# Patient Record
Sex: FEMALE | Race: WHITE | NOT HISPANIC OR LATINO | Employment: PART TIME | URBAN - METROPOLITAN AREA
[De-identification: names, ages, dates, MRNs, and addresses within clinical notes are randomized per-mention and may not be internally consistent; named-entity substitution may affect disease eponyms.]

---
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
## 2022-12-12 ENCOUNTER — OFFICE VISIT (OUTPATIENT)
Dept: INTERNAL MEDICINE | Facility: CLINIC | Age: 22
End: 2022-12-12

## 2022-12-12 VITALS
SYSTOLIC BLOOD PRESSURE: 150 MMHG | HEART RATE: 101 BPM | BODY MASS INDEX: 40.06 KG/M2 | HEIGHT: 70 IN | WEIGHT: 279.8 LBS | TEMPERATURE: 98.2 F | DIASTOLIC BLOOD PRESSURE: 96 MMHG | OXYGEN SATURATION: 98 % | RESPIRATION RATE: 18 BRPM

## 2022-12-12 DIAGNOSIS — H73.92 ABNORMAL TYMPANIC MEMBRANE OF LEFT EAR: ICD-10-CM

## 2022-12-12 DIAGNOSIS — Z00.00 ROUTINE HEALTH MAINTENANCE: ICD-10-CM

## 2022-12-12 DIAGNOSIS — I10 ESSENTIAL HYPERTENSION: Primary | ICD-10-CM

## 2022-12-12 DIAGNOSIS — Z83.49 FAMILY HISTORY OF HASHIMOTO THYROIDITIS: ICD-10-CM

## 2022-12-12 DIAGNOSIS — E66.01 MORBID (SEVERE) OBESITY DUE TO EXCESS CALORIES: ICD-10-CM

## 2022-12-12 DIAGNOSIS — R53.83 FATIGUE, UNSPECIFIED TYPE: ICD-10-CM

## 2022-12-12 PROCEDURE — 99204 OFFICE O/P NEW MOD 45 MIN: CPT | Performed by: NURSE PRACTITIONER

## 2022-12-12 RX ORDER — NIFEDIPINE 30 MG/1
30 TABLET, EXTENDED RELEASE ORAL DAILY
Qty: 30 TABLET | Refills: 1 | Status: SHIPPED | OUTPATIENT
Start: 2022-12-12 | End: 2023-02-07 | Stop reason: SDUPTHER

## 2022-12-12 NOTE — PATIENT INSTRUCTIONS
Gynecology:    [address] [facility] [address] [facility], [address]   [phone]    Mental Health and Counseling Services:    [facility] or [facility] Direct - [email or web address]    [facility]:  [phone]; [email or web address]    [facility]:  [phone]

## 2022-12-12 NOTE — PROGRESS NOTES
Subjective    Estela Santos is a 22 y.o. female presenting today for   Chief Complaint   Patient presents with   • Establish Care     Having blood pressure concerns       History of Present Illness     Estela Santos presents today as a new patient to me to establish care.   Prior PCP was no one for many years.  Patient Care Team:  Crystal Bobo APRN as PCP - General (Family Medicine)    Current/chronic health conditions include:    There is no problem list on file for this patient.      No outpatient medications have been marked as taking for the 12/12/22 encounter (Office Visit) with Crystal Bobo APRN.       Prior to 2020 she had been tx'ed for HTN w/ 10mg of Lisinopril. She had gone to a [facility] for a refill and was prescribed 60days. She ran out of this approx 2-3 weeks ago. She was dx'ed w/ HTN in 2016 as a young teenager. She had renal scans and holter and this was determined to be Essential HTN. She does not self-monitor. She affirms a cough when she takes Lisinopril.    No recent labs.    Regular periods. No current contraception. She is sexually active.    There is FH of hashimoto in her mother. Pt struggles w/ fatigue and inability to lose wgt.    She had a L OM in Nov and completed abx but is still unable to hear in this ear.      The following portions of the patient's history were reviewed and updated as appropriate: allergies, current medications, problem list, past medical history, past surgical history, family history, and social history.     Review of Systems   HENT: Negative for ear discharge and ear pain.    Respiratory: Positive for cough. Negative for shortness of breath.    Cardiovascular: Negative for chest pain and palpitations.   Neurological: Negative for dizziness and headache.       Objective    Vitals:    12/12/22 1307   BP: 150/96   BP Location: Right arm   Patient Position: Sitting   Cuff Size: Large Adult   Pulse: 101   Resp: 18   Temp: 98.2 °F (36.8 °C)   TempSrc:  "Infrared   SpO2: 98%   Weight: 127 kg (279 lb 12.8 oz)   Height: 177.8 cm (70\")     Body mass index is 40.15 kg/m².  Nursing notes and vitals reviewed.    Physical Exam  Constitutional:       General: She is not in acute distress.     Appearance: She is well-developed.   HENT:      Head: Normocephalic.      Right Ear: Hearing, tympanic membrane, ear canal and external ear normal.      Left Ear: External ear normal.      Ears:      Comments: The anatomy of the L TM is distorted. There maybe a perforation. The AC is moist appearing.     Nose: Nose normal.      Mouth/Throat:      Mouth: Mucous membranes are moist.      Pharynx: Oropharynx is clear. Uvula midline.   Neck:      Thyroid: No thyroid mass or thyromegaly.   Cardiovascular:      Rate and Rhythm: Regular rhythm.      Pulses: Normal pulses.      Heart sounds: S1 normal and S2 normal. No murmur heard.    No friction rub. No gallop.   Pulmonary:      Effort: Pulmonary effort is normal.      Breath sounds: Normal breath sounds. No wheezing, rhonchi or rales.   Musculoskeletal:      Cervical back: Neck supple.   Lymphadenopathy:      Cervical: No cervical adenopathy.   Neurological:      Mental Status: She is alert and oriented to person, place, and time.      Cranial Nerves: No cranial nerve deficit.      Sensory: No sensory deficit.   Psychiatric:         Attention and Perception: She is attentive.         Speech: Speech normal.         Behavior: Behavior normal.         No results found for this or any previous visit (from the past 672 hour(s)).      Assessment and Plan    Diagnoses and all orders for this visit:    1. Essential hypertension (Primary)  -     NIFEdipine XL (Procardia XL) 30 MG 24 hr tablet; Take 1 tablet by mouth Daily.  Dispense: 30 tablet; Refill: 1  -     CBC (No Diff)  -     Comprehensive Metabolic Panel  -     Lipid Panel With / Chol / HDL Ratio  -     TSH  -     T4, Free    2. Family history of Hashimoto thyroiditis    3. Fatigue, " unspecified type  -     CBC (No Diff)  -     Comprehensive Metabolic Panel  -     Hemoglobin A1c  -     TSH  -     T4, Free  -     Thyroid Antibodies  -     Vitamin B12  -     Vitamin D,25-Hydroxy    4. Morbid (severe) obesity due to excess calories (HCC)  -     Hemoglobin A1c  -     Lipid Panel With / Chol / HDL Ratio  -     TSH  -     T4, Free  -     Thyroid Antibodies    5. Abnormal tympanic membrane of left ear  -     Ambulatory Referral to ENT (Otolaryngology)    6. Routine health maintenance  -     Hepatitis C Antibody              Medications, including side effects, were discussed with the patient. Patient verbalized understanding.  The plan of care was discussed. All questions were answered. Patient verbalized understanding.        Return for asap, Annual physical, fasting labs one week prior to.

## 2022-12-30 LAB
25(OH)D3+25(OH)D2 SERPL-MCNC: 12.9 NG/ML (ref 30–100)
ALBUMIN SERPL-MCNC: 4.3 G/DL (ref 3.5–5.2)
ALBUMIN/GLOB SERPL: 1.5 G/DL
ALP SERPL-CCNC: 83 U/L (ref 39–117)
ALT SERPL-CCNC: 33 U/L (ref 1–33)
AST SERPL-CCNC: 24 U/L (ref 1–32)
BILIRUB SERPL-MCNC: 0.3 MG/DL (ref 0–1.2)
BUN SERPL-MCNC: 7 MG/DL (ref 6–20)
BUN/CREAT SERPL: 11.3 (ref 7–25)
CALCIUM SERPL-MCNC: 9.1 MG/DL (ref 8.6–10.5)
CHLORIDE SERPL-SCNC: 106 MMOL/L (ref 98–107)
CHOLEST SERPL-MCNC: 145 MG/DL (ref 0–200)
CHOLEST/HDLC SERPL: 5 {RATIO}
CO2 SERPL-SCNC: 24.2 MMOL/L (ref 22–29)
CREAT SERPL-MCNC: 0.62 MG/DL (ref 0.57–1)
EGFRCR SERPLBLD CKD-EPI 2021: 130.9 ML/MIN/1.73
ERYTHROCYTE [DISTWIDTH] IN BLOOD BY AUTOMATED COUNT: 14.9 % (ref 12.3–15.4)
GLOBULIN SER CALC-MCNC: 2.9 GM/DL
GLUCOSE SERPL-MCNC: 100 MG/DL (ref 65–99)
HBA1C MFR BLD: 5.4 % (ref 4.8–5.6)
HCT VFR BLD AUTO: 41.7 % (ref 34–46.6)
HCV AB S/CO SERPL IA: 0.2 S/CO RATIO (ref 0–0.9)
HDLC SERPL-MCNC: 29 MG/DL (ref 40–60)
HGB BLD-MCNC: 13.3 G/DL (ref 12–15.9)
LDLC SERPL CALC-MCNC: 95 MG/DL (ref 0–100)
MCH RBC QN AUTO: 25.6 PG (ref 26.6–33)
MCHC RBC AUTO-ENTMCNC: 31.9 G/DL (ref 31.5–35.7)
MCV RBC AUTO: 80.2 FL (ref 79–97)
PLATELET # BLD AUTO: 347 10*3/MM3 (ref 140–450)
POTASSIUM SERPL-SCNC: 4.1 MMOL/L (ref 3.5–5.2)
PROT SERPL-MCNC: 7.2 G/DL (ref 6–8.5)
RBC # BLD AUTO: 5.2 10*6/MM3 (ref 3.77–5.28)
SODIUM SERPL-SCNC: 138 MMOL/L (ref 136–145)
T4 FREE SERPL-MCNC: 1.1 NG/DL (ref 0.93–1.7)
THYROGLOB AB SERPL-ACNC: <1 IU/ML (ref 0–0.9)
THYROPEROXIDASE AB SERPL-ACNC: 9 IU/ML (ref 0–34)
TRIGL SERPL-MCNC: 115 MG/DL (ref 0–150)
TSH SERPL DL<=0.005 MIU/L-ACNC: 2.94 UIU/ML (ref 0.27–4.2)
VIT B12 SERPL-MCNC: 272 PG/ML (ref 211–946)
VLDLC SERPL CALC-MCNC: 21 MG/DL (ref 5–40)
WBC # BLD AUTO: 8.4 10*3/MM3 (ref 3.4–10.8)

## 2023-01-05 ENCOUNTER — OFFICE VISIT (OUTPATIENT)
Dept: INTERNAL MEDICINE | Facility: CLINIC | Age: 23
End: 2023-01-05
Payer: COMMERCIAL

## 2023-01-05 VITALS
BODY MASS INDEX: 40 KG/M2 | HEART RATE: 107 BPM | WEIGHT: 279.4 LBS | HEIGHT: 70 IN | TEMPERATURE: 98.2 F | DIASTOLIC BLOOD PRESSURE: 80 MMHG | SYSTOLIC BLOOD PRESSURE: 132 MMHG | OXYGEN SATURATION: 97 %

## 2023-01-05 DIAGNOSIS — E78.6 LOW HDL (UNDER 40): ICD-10-CM

## 2023-01-05 DIAGNOSIS — E55.9 VITAMIN D DEFICIENCY: ICD-10-CM

## 2023-01-05 DIAGNOSIS — I10 ESSENTIAL HYPERTENSION: ICD-10-CM

## 2023-01-05 DIAGNOSIS — Z00.00 ENCOUNTER FOR WELLNESS EXAMINATION IN ADULT: Primary | ICD-10-CM

## 2023-01-05 PROCEDURE — 99395 PREV VISIT EST AGE 18-39: CPT | Performed by: NURSE PRACTITIONER

## 2023-01-05 NOTE — PATIENT INSTRUCTIONS
Take 5000 IU of vitamin D everyday.    Gynecology:    [address], [address]   [phone]

## 2023-01-05 NOTE — PROGRESS NOTES
SUZI Palmer is a 22 y.o. female presenting for Annual Exam    Her current/chronic health conditions include:  There is no problem list on file for this patient.      Outpatient Medications Marked as Taking for the 1/5/23 encounter (Office Visit) with Crystal Bobo APRN   Medication Sig Dispense Refill   • NIFEdipine XL (Procardia XL) 30 MG 24 hr tablet Take 1 tablet by mouth Daily. 30 tablet 1         Screenings:  PAP: She has been sexually active for approx 3 yrs. No prior PAP.  Mammogram: no FH of breast cancer  Dexa: n/a  Colon Cancer:no 1st degree FH of CRC  Tob use: n/a          The following portions of the patient's history were reviewed and updated as appropriate: allergies, current medications, problem list, past medical history, past family history, and past social history.    Review of Systems   Constitutional: Negative.    HENT: Negative.    Eyes: Negative.    Respiratory: Negative.    Cardiovascular: Negative.    Gastrointestinal: Negative.    Endocrine: Negative.    Genitourinary: Negative.    Musculoskeletal: Negative.    Skin: Negative.    Allergic/Immunologic: Negative.    Neurological: Negative.    Hematological: Negative.    Psychiatric/Behavioral: Negative.        OBJECTIVE    Vitals:    01/05/23 1448   BP: 132/80   BP Location: Right arm   Pulse: 107   Temp: 98.2 °F (36.8 °C)   SpO2: 97%   Weight: 127 kg (279 lb 6.4 oz)   Height: 177.8 cm (70\")       BP Readings from Last 3 Encounters:   01/05/23 132/80   12/12/22 150/96        Wt Readings from Last 3 Encounters:   01/05/23 127 kg (279 lb 6.4 oz)   12/12/22 127 kg (279 lb 12.8 oz)        Body mass index is 40.09 kg/m².  Nursing notes and vital signs reviewed.      Physical Exam  Constitutional:       General: She is not in acute distress.     Appearance: Normal appearance. She is well-developed.   HENT:      Head: Normocephalic.      Right Ear: Hearing, tympanic membrane, ear canal and external ear normal.      Left Ear:  Hearing, tympanic membrane, ear canal and external ear normal.      Nose: Nose normal. No mucosal edema or rhinorrhea.      Mouth/Throat:      Mouth: Mucous membranes are moist.      Pharynx: Oropharynx is clear. Uvula midline.   Eyes:      General: Lids are normal.      Extraocular Movements: Extraocular movements intact.      Conjunctiva/sclera: Conjunctivae normal.      Pupils: Pupils are equal, round, and reactive to light.   Neck:      Thyroid: No thyroid mass or thyromegaly.   Cardiovascular:      Rate and Rhythm: Regular rhythm.      Pulses: Normal pulses.      Heart sounds: S1 normal and S2 normal. No murmur heard.    No friction rub. No gallop.   Pulmonary:      Effort: Pulmonary effort is normal.      Breath sounds: Normal breath sounds. No wheezing, rhonchi or rales.   Abdominal:      General: Bowel sounds are normal.      Palpations: Abdomen is soft.      Tenderness: There is no abdominal tenderness. There is no guarding.      Hernia: No hernia is present.   Musculoskeletal:         General: No deformity. Normal range of motion.      Cervical back: Normal range of motion and neck supple.   Lymphadenopathy:      Cervical: No cervical adenopathy.   Skin:     General: Skin is warm and dry.      Findings: No lesion or rash.   Neurological:      General: No focal deficit present.      Mental Status: She is alert and oriented to person, place, and time.      Cranial Nerves: No cranial nerve deficit.      Sensory: No sensory deficit.      Motor: Motor function is intact.      Coordination: Coordination is intact.      Gait: Gait normal.      Deep Tendon Reflexes: Reflexes are normal and symmetric.   Psychiatric:         Attention and Perception: She is attentive.         Mood and Affect: Mood and affect normal.         Speech: Speech normal.         Behavior: Behavior normal.         Thought Content: Thought content normal.         Recent Results (from the past 672 hour(s))   CBC (No Diff)    Collection Time:  12/29/22  8:47 AM    Specimen: Blood   Result Value Ref Range    WBC 8.40 3.40 - 10.80 10*3/mm3    RBC 5.20 3.77 - 5.28 10*6/mm3    Hemoglobin 13.3 12.0 - 15.9 g/dL    Hematocrit 41.7 34.0 - 46.6 %    MCV 80.2 79.0 - 97.0 fL    MCH 25.6 (L) 26.6 - 33.0 pg    MCHC 31.9 31.5 - 35.7 g/dL    RDW 14.9 12.3 - 15.4 %    Platelets 347 140 - 450 10*3/mm3   Comprehensive Metabolic Panel    Collection Time: 12/29/22  8:47 AM    Specimen: Blood   Result Value Ref Range    Glucose 100 (H) 65 - 99 mg/dL    BUN 7 6 - 20 mg/dL    Creatinine 0.62 0.57 - 1.00 mg/dL    EGFR Result 130.9 >60.0 mL/min/1.73    BUN/Creatinine Ratio 11.3 7.0 - 25.0    Sodium 138 136 - 145 mmol/L    Potassium 4.1 3.5 - 5.2 mmol/L    Chloride 106 98 - 107 mmol/L    Total CO2 24.2 22.0 - 29.0 mmol/L    Calcium 9.1 8.6 - 10.5 mg/dL    Total Protein 7.2 6.0 - 8.5 g/dL    Albumin 4.3 3.5 - 5.2 g/dL    Globulin 2.9 gm/dL    A/G Ratio 1.5 g/dL    Total Bilirubin 0.3 0.0 - 1.2 mg/dL    Alkaline Phosphatase 83 39 - 117 U/L    AST (SGOT) 24 1 - 32 U/L    ALT (SGPT) 33 1 - 33 U/L   Hemoglobin A1c    Collection Time: 12/29/22  8:47 AM    Specimen: Blood   Result Value Ref Range    Hemoglobin A1C 5.40 4.80 - 5.60 %   Hepatitis C Antibody    Collection Time: 12/29/22  8:47 AM    Specimen: Blood   Result Value Ref Range    Hep C Virus Ab 0.2 0.0 - 0.9 s/co ratio   Lipid Panel With / Chol / HDL Ratio    Collection Time: 12/29/22  8:47 AM    Specimen: Blood   Result Value Ref Range    Total Cholesterol 145 0 - 200 mg/dL    Triglycerides 115 0 - 150 mg/dL    HDL Cholesterol 29 (L) 40 - 60 mg/dL    VLDL Cholesterol Ray 21 5 - 40 mg/dL    LDL Chol Calc (NIH) 95 0 - 100 mg/dL    Chol/HDL Ratio 5.00    TSH    Collection Time: 12/29/22  8:47 AM    Specimen: Blood   Result Value Ref Range    TSH 2.940 0.270 - 4.200 uIU/mL   T4, Free    Collection Time: 12/29/22  8:47 AM    Specimen: Blood   Result Value Ref Range    Free T4 1.10 0.93 - 1.70 ng/dL   Thyroid Antibodies    Collection  Time: 12/29/22  8:47 AM    Specimen: Blood   Result Value Ref Range    Thyroid Peroxidase Antibody 9 0 - 34 IU/mL    Thyroglobulin Ab <1.0 0.0 - 0.9 IU/mL   Vitamin B12    Collection Time: 12/29/22  8:47 AM    Specimen: Blood   Result Value Ref Range    Vitamin B-12 272 211 - 946 pg/mL   Vitamin D,25-Hydroxy    Collection Time: 12/29/22  8:47 AM    Specimen: Blood   Result Value Ref Range    25 Hydroxy, Vitamin D 12.9 (L) 30.0 - 100.0 ng/ml         ASSESSMENT AND PLAN    Diagnoses and all orders for this visit:    1. Encounter for wellness examination in adult (Primary)    2. Essential hypertension    3. Low HDL (under 40)    4. Vitamin D deficiency        Preventative counseling completed including relevant screenings, appropriate vaccinations, healthy nutrition, and appropriate physical activity. Age-appropriate Screening & Interventions recommended by USPSTF were reviewed with the patient, and Health Maintenance was updated in Epic.  Class 3 Severe Obesity (BMI >=40). Obesity-related health conditions include the following: hypertension. Obesity is newly identified. BMI is is above average; BMI management plan is completed. We discussed low calorie, low carb based diet program, portion control, increasing exercise and an yudi-based approach such as Artabase Pal or Lose It.      Cont Procardia.  Start a QD vit D supp of 5000IU.      Medications, including side effects, were discussed with the patient. Patient verbalized understanding.  The plan of care was discussed. All questions were answered. Patient verbalized understanding.        Return in about 3 months (around 4/5/2023)., or sooner if needed.

## 2023-02-07 DIAGNOSIS — I10 ESSENTIAL HYPERTENSION: ICD-10-CM

## 2023-02-08 RX ORDER — NIFEDIPINE 30 MG/1
30 TABLET, EXTENDED RELEASE ORAL DAILY
Qty: 30 TABLET | Refills: 1 | Status: SHIPPED | OUTPATIENT
Start: 2023-02-08

## 2023-04-10 ENCOUNTER — OFFICE VISIT (OUTPATIENT)
Dept: INTERNAL MEDICINE | Facility: CLINIC | Age: 23
End: 2023-04-10
Payer: COMMERCIAL

## 2023-04-10 VITALS
WEIGHT: 269.8 LBS | TEMPERATURE: 98.6 F | DIASTOLIC BLOOD PRESSURE: 88 MMHG | RESPIRATION RATE: 18 BRPM | OXYGEN SATURATION: 98 % | SYSTOLIC BLOOD PRESSURE: 138 MMHG | BODY MASS INDEX: 38.63 KG/M2 | HEART RATE: 110 BPM | HEIGHT: 70 IN

## 2023-04-10 DIAGNOSIS — I10 ESSENTIAL HYPERTENSION: ICD-10-CM

## 2023-04-10 DIAGNOSIS — I10 ESSENTIAL HYPERTENSION: Primary | ICD-10-CM

## 2023-04-10 PROCEDURE — 99213 OFFICE O/P EST LOW 20 MIN: CPT | Performed by: NURSE PRACTITIONER

## 2023-04-10 RX ORDER — NIFEDIPINE 30 MG/1
TABLET, EXTENDED RELEASE ORAL
Qty: 90 TABLET | Refills: 1 | Status: SHIPPED | OUTPATIENT
Start: 2023-04-10

## 2023-04-10 RX ORDER — ADHESIVE BANDAGE 3/4"
1 BANDAGE TOPICAL DAILY
Qty: 1 EACH | Refills: 0 | Status: SHIPPED | OUTPATIENT
Start: 2023-04-10

## 2023-04-10 NOTE — PROGRESS NOTES
"Subjective   Estela Santos is a 22 y.o. female presenting today for follow up of   Chief Complaint   Patient presents with   • Hypertension     3 month follow up       History of Present Illness     Outpatient Medications Marked as Taking for the 4/10/23 encounter (Office Visit) with Crystal Bobo APRN   Medication Sig Dispense Refill   • NIFEdipine XL (Procardia XL) 30 MG 24 hr tablet Take 1 tablet by mouth Daily. 30 tablet 1       Presents for 3mo f/u r/t HTN. Very stressed today.      The following portions of the patient's history were reviewed and updated as appropriate: allergies, current medications, past family history, past medical history, past social history, past surgical history and problem list.    Review of Systems   Respiratory: Negative for shortness of breath.    Cardiovascular: Negative for chest pain and palpitations.   Neurological: Positive for headache (mild). Negative for dizziness.   Psychiatric/Behavioral: The patient is nervous/anxious.        Objective   Vitals:    04/10/23 1540 04/10/23 1555   BP: (!) 150/102 138/88   BP Location: Left arm    Patient Position: Sitting    Cuff Size: Large Adult    Pulse: (!) 152 110   Resp: 18    Temp: 98.6 °F (37 °C)    TempSrc: Oral    SpO2: 98%    Weight: 122 kg (269 lb 12.8 oz)    Height: 177.8 cm (70\")        BP Readings from Last 3 Encounters:   04/10/23 138/88   01/05/23 132/80   12/12/22 150/96        Wt Readings from Last 3 Encounters:   04/10/23 122 kg (269 lb 12.8 oz)   01/05/23 127 kg (279 lb 6.4 oz)   12/12/22 127 kg (279 lb 12.8 oz)        Body mass index is 38.71 kg/m².  Nursing notes and vitals reviewed.    Physical Exam  Constitutional:       General: She is not in acute distress.     Appearance: She is well-developed.   Cardiovascular:      Rate and Rhythm: Regular rhythm.      Heart sounds: S1 normal and S2 normal.   Pulmonary:      Effort: Pulmonary effort is normal.      Breath sounds: Normal breath sounds.   Neurological: "      Mental Status: She is alert and oriented to person, place, and time.   Psychiatric:         Attention and Perception: She is attentive.         Behavior: Behavior normal.         Thought Content: Thought content normal.         No results found for this or any previous visit (from the past 672 hour(s)).      Assessment & Plan   Diagnoses and all orders for this visit:    1. Essential hypertension (Primary)  -     Blood Pressure Monitoring (Blood Pressure Cuff) misc; 1 each Daily.  Dispense: 1 each; Refill: 0  - Marginal control  - Check BP readings at home and send in [employer] message each month  - No med change              Medications, including side effects, were discussed with the patient. Patient verbalized understanding.  The plan of care was discussed. All questions were answered. Patient verbalized understanding.      Return in about 3 months (around 7/10/2023).

## 2023-09-29 ENCOUNTER — OFFICE VISIT (OUTPATIENT)
Dept: INTERNAL MEDICINE | Facility: CLINIC | Age: 23
End: 2023-09-29
Payer: COMMERCIAL

## 2023-09-29 VITALS
WEIGHT: 263 LBS | HEIGHT: 70 IN | TEMPERATURE: 99.3 F | OXYGEN SATURATION: 97 % | DIASTOLIC BLOOD PRESSURE: 68 MMHG | HEART RATE: 92 BPM | SYSTOLIC BLOOD PRESSURE: 150 MMHG | BODY MASS INDEX: 37.65 KG/M2

## 2023-09-29 DIAGNOSIS — I10 ESSENTIAL HYPERTENSION: Primary | ICD-10-CM

## 2023-09-29 DIAGNOSIS — F41.1 GAD (GENERALIZED ANXIETY DISORDER): ICD-10-CM

## 2023-09-29 DIAGNOSIS — F33.1 MODERATE EPISODE OF RECURRENT MAJOR DEPRESSIVE DISORDER: ICD-10-CM

## 2023-09-29 PROCEDURE — 99214 OFFICE O/P EST MOD 30 MIN: CPT | Performed by: NURSE PRACTITIONER

## 2023-09-29 RX ORDER — SERTRALINE HYDROCHLORIDE 25 MG/1
25 TABLET, FILM COATED ORAL DAILY
Qty: 30 TABLET | Refills: 1 | Status: SHIPPED | OUTPATIENT
Start: 2023-09-29

## 2023-09-29 NOTE — PROGRESS NOTES
"Subjective   Estela Santos is a 23 y.o. female presenting today for follow up of   Chief Complaint   Patient presents with    Hypertension     F/u       History of Present Illness     Outpatient Medications Marked as Taking for the 9/29/23 encounter (Office Visit) with Crystal Bobo APRN   Medication Sig Dispense Refill    NIFEdipine XL (PROCARDIA XL) 60 MG 24 hr tablet Take 1 tablet by mouth Daily. 90 tablet 0       Presents for f/u 2/2 HTN.  She has been trying exercise over the past week. Walking 2.5miles.  Trying to eat at a calorie deficit. Averaging around 2000cal. Cutting back on sodium.  SMBP = 132-140/80-90    Reports h/o AMELIA and MDD.  Dx at 15.  Inpt care at dx d/t SI. No current SI/HI.  Has been tx'ed w/ Prozac and Effexor in the past. No meds since around onset of COVID. Did not like how she felt w/ Effexor. Prozac made her feel numb.  AMELIA worse since break up last mo.  - motivation  Shunned by most of family when she left their melissa, [employer].      The following portions of the patient's history were reviewed and updated as appropriate: allergies, current medications, past family history, past medical history, past social history, past surgical history and problem list.    Review of Systems   Eyes: Negative.    Respiratory: Negative.     Cardiovascular: Negative.    Neurological: Negative.      Objective   Vitals:    09/29/23 1527 09/29/23 1545   BP: 164/96 150/68   BP Location: Left arm    Patient Position: Sitting    Cuff Size: Adult    Pulse: 92    Temp: 99.3 °F (37.4 °C)    TempSrc: Infrared    SpO2: 97%    Weight: 119 kg (263 lb)    Height: 177.8 cm (70\")        BP Readings from Last 3 Encounters:   09/29/23 150/68   07/10/23 160/80   04/10/23 138/88        Wt Readings from Last 3 Encounters:   09/29/23 119 kg (263 lb)   07/10/23 124 kg (272 lb 9.6 oz)   04/10/23 122 kg (269 lb 12.8 oz)        Body mass index is 37.74 kg/m².  Nursing notes and vitals reviewed.    Physical " Exam  Constitutional:       General: She is not in acute distress.     Appearance: She is well-developed.   Cardiovascular:      Rate and Rhythm: Regular rhythm.      Heart sounds: S1 normal and S2 normal.   Pulmonary:      Effort: Pulmonary effort is normal.      Breath sounds: Normal breath sounds.   Neurological:      Mental Status: She is alert and oriented to person, place, and time.   Psychiatric:         Attention and Perception: She is attentive.         Behavior: Behavior normal.         Thought Content: Thought content normal.       No results found for this or any previous visit (from the past 672 hour(s)).      Assessment & Plan   Diagnoses and all orders for this visit:    1. Essential hypertension (Primary)    2. Moderate episode of recurrent major depressive disorder  -     sertraline (Zoloft) 25 MG tablet; Take 1 tablet by mouth Daily.  Dispense: 30 tablet; Refill: 1    3. AMELIA (generalized anxiety disorder)  -     sertraline (Zoloft) 25 MG tablet; Take 1 tablet by mouth Daily.  Dispense: 30 tablet; Refill: 1      1.BP elevated in office today. Home readings better but still above goal. Would like to see how anxiety management improves BP.    2, 3. Start Zoloft.        Medications, including side effects, were discussed with the patient. Patient verbalized understanding.  The plan of care was discussed. All questions were answered. Patient verbalized understanding.      Return in about 6 weeks (around 11/10/2023).

## 2023-10-14 DIAGNOSIS — I10 ESSENTIAL HYPERTENSION: ICD-10-CM

## 2023-10-16 RX ORDER — NIFEDIPINE 60 MG/1
60 TABLET, EXTENDED RELEASE ORAL DAILY
Qty: 90 TABLET | Refills: 0 | Status: SHIPPED | OUTPATIENT
Start: 2023-10-16

## 2023-11-08 DIAGNOSIS — F41.1 GAD (GENERALIZED ANXIETY DISORDER): ICD-10-CM

## 2023-11-08 DIAGNOSIS — F33.1 MODERATE EPISODE OF RECURRENT MAJOR DEPRESSIVE DISORDER: ICD-10-CM

## 2023-11-08 RX ORDER — SERTRALINE HYDROCHLORIDE 25 MG/1
25 TABLET, FILM COATED ORAL DAILY
Qty: 30 TABLET | Refills: 1 | Status: SHIPPED | OUTPATIENT
Start: 2023-11-08

## 2023-11-16 ENCOUNTER — OFFICE VISIT (OUTPATIENT)
Dept: INTERNAL MEDICINE | Facility: CLINIC | Age: 23
End: 2023-11-16
Payer: COMMERCIAL

## 2023-11-16 VITALS
WEIGHT: 255.4 LBS | HEIGHT: 70 IN | OXYGEN SATURATION: 97 % | DIASTOLIC BLOOD PRESSURE: 82 MMHG | BODY MASS INDEX: 36.56 KG/M2 | HEART RATE: 116 BPM | SYSTOLIC BLOOD PRESSURE: 130 MMHG | TEMPERATURE: 98.2 F

## 2023-11-16 DIAGNOSIS — I10 ESSENTIAL HYPERTENSION: ICD-10-CM

## 2023-11-16 DIAGNOSIS — Z13.1 ENCOUNTER FOR SCREENING FOR DIABETES MELLITUS: ICD-10-CM

## 2023-11-16 DIAGNOSIS — E66.01 CLASS 2 SEVERE OBESITY WITH SERIOUS COMORBIDITY AND BODY MASS INDEX (BMI) OF 36.0 TO 36.9 IN ADULT, UNSPECIFIED OBESITY TYPE: ICD-10-CM

## 2023-11-16 DIAGNOSIS — F41.1 GAD (GENERALIZED ANXIETY DISORDER): ICD-10-CM

## 2023-11-16 DIAGNOSIS — Z23 NEED FOR INFLUENZA VACCINATION: ICD-10-CM

## 2023-11-16 DIAGNOSIS — F33.1 MODERATE EPISODE OF RECURRENT MAJOR DEPRESSIVE DISORDER: Primary | ICD-10-CM

## 2023-11-16 NOTE — PROGRESS NOTES
"Lisa Santos is a 23 y.o. female presenting today for follow up of   Chief Complaint   Patient presents with    Hypertension         Outpatient Medications Marked as Taking for the 11/16/23 encounter (Office Visit) with Crystal Bobo APRN   Medication Sig Dispense Refill    NIFEdipine XL (PROCARDIA XL) 60 MG 24 hr tablet Take 1 tablet by mouth Daily. 90 tablet 0    sertraline (Zoloft) 50 MG tablet Take 1 tablet by mouth Daily. 90 tablet 1    [DISCONTINUED] sertraline (Zoloft) 25 MG tablet Take 1 tablet by mouth Daily. 30 tablet 1       Presents for 6wk f/u 2/2 AMELIA and MDD following start of Zoloft.  She would like to be referred to a psychologist. Her mother has a dx of Bipolar 2. Pt denies episodes of rosanne.  No negative SE from Zoloft. She feels benefits ore as they r/t anxiety, less as r/t depression. Feeling numb.    Also presents for f/u 2/2 HTN. SMBP 130-135/80s.      The following portions of the patient's history were reviewed and updated as appropriate: allergies, current medications, past family history, past medical history, past social history, past surgical history and problem list.    Review of Systems   Respiratory:  Negative for shortness of breath.    Cardiovascular:  Negative for chest pain.   Neurological:  Negative for dizziness and headache.   Psychiatric/Behavioral:  Negative for self-injury and suicidal ideas.        Objective   Vitals:    11/16/23 1442 11/16/23 1515   BP: 144/82 130/82   BP Location: Left arm    Patient Position: Sitting    Cuff Size: Large Adult    Pulse: 116    Temp: 98.2 °F (36.8 °C)    TempSrc: Infrared    SpO2: 97%    Weight: 116 kg (255 lb 6.4 oz)    Height: 177.8 cm (70\")        BP Readings from Last 3 Encounters:   11/16/23 130/82   09/29/23 150/68   07/10/23 160/80        Wt Readings from Last 3 Encounters:   11/16/23 116 kg (255 lb 6.4 oz)   09/29/23 119 kg (263 lb)   07/10/23 124 kg (272 lb 9.6 oz)        Body mass index is 36.65 " kg/m².  Nursing notes and vitals reviewed.    Physical Exam  Constitutional:       General: She is not in acute distress.     Appearance: She is well-developed.   Cardiovascular:      Rate and Rhythm: Regular rhythm.      Heart sounds: S1 normal and S2 normal.   Pulmonary:      Effort: Pulmonary effort is normal.      Breath sounds: Normal breath sounds.   Neurological:      Mental Status: She is alert and oriented to person, place, and time.   Psychiatric:         Attention and Perception: She is attentive.         Behavior: Behavior normal.         Thought Content: Thought content normal.         No results found for this or any previous visit (from the past 672 hour(s)).      Assessment & Plan   Diagnoses and all orders for this visit:    1. Moderate episode of recurrent major depressive disorder (Primary)  -     sertraline (Zoloft) 50 MG tablet; Take 1 tablet by mouth Daily.  Dispense: 90 tablet; Refill: 1  -     Ambulatory Referral to Psychology    2. AMELIA (generalized anxiety disorder)  -     sertraline (Zoloft) 50 MG tablet; Take 1 tablet by mouth Daily.  Dispense: 90 tablet; Refill: 1  -     Ambulatory Referral to Psychology    3. Essential hypertension    4. Need for influenza vaccination  -     Fluzone (or Fluarix & Flulaval for VFC) >6mos        #1, 2. Increase sertraline to 50mg.    3. Much better control. Cont current medication regimen.      Medications, including side effects, were discussed with the patient. Patient verbalized understanding.  The plan of care was discussed. All questions were answered. Patient verbalized understanding.      Return in about 8 weeks (around 1/11/2024) for Annual physical, fasting labs one week prior to.

## 2023-12-26 DIAGNOSIS — Z13.1 ENCOUNTER FOR SCREENING FOR DIABETES MELLITUS: ICD-10-CM

## 2023-12-26 DIAGNOSIS — E66.01 CLASS 2 SEVERE OBESITY WITH SERIOUS COMORBIDITY AND BODY MASS INDEX (BMI) OF 36.0 TO 36.9 IN ADULT, UNSPECIFIED OBESITY TYPE: ICD-10-CM

## 2023-12-26 DIAGNOSIS — I10 ESSENTIAL HYPERTENSION: ICD-10-CM

## 2024-01-06 DIAGNOSIS — I10 ESSENTIAL HYPERTENSION: ICD-10-CM

## 2024-01-08 RX ORDER — NIFEDIPINE 60 MG/1
60 TABLET, EXTENDED RELEASE ORAL DAILY
Qty: 90 TABLET | Refills: 0 | Status: SHIPPED | OUTPATIENT
Start: 2024-01-08

## 2024-01-08 NOTE — TELEPHONE ENCOUNTER
Normal serum potassium in past 12 months    GFR> 30 ml/min in past 12 months       Rx Refill Note  Requested Prescriptions     Pending Prescriptions Disp Refills    NIFEdipine XL (PROCARDIA XL) 60 MG 24 hr tablet 90 tablet 0     Sig: Take 1 tablet by mouth Daily.      Last office visit with prescribing clinician: 11/16/2023   Last telemedicine visit with prescribing clinician: Visit date not found   Next office visit with prescribing clinician: Visit date not found                         Would you like a call back once the refill request has been completed: [] Yes [] No    If the office needs to give you a call back, can they leave a voicemail: [] Yes [] No    Brenda Oneill, PCT  01/08/24, 10:48 EST

## 2024-02-07 ENCOUNTER — TELEMEDICINE (OUTPATIENT)
Dept: PSYCHIATRY | Facility: CLINIC | Age: 24
End: 2024-02-07
Payer: COMMERCIAL

## 2024-02-07 DIAGNOSIS — F43.12 CHRONIC POST-TRAUMATIC STRESS DISORDER (PTSD): ICD-10-CM

## 2024-02-07 DIAGNOSIS — F33.1 MODERATE EPISODE OF RECURRENT MAJOR DEPRESSIVE DISORDER: Primary | ICD-10-CM

## 2024-02-07 DIAGNOSIS — F41.1 GAD (GENERALIZED ANXIETY DISORDER): ICD-10-CM

## 2024-02-07 RX ORDER — BUPROPION HYDROCHLORIDE 150 MG/1
150 TABLET ORAL EVERY MORNING
Qty: 30 TABLET | Refills: 0 | Status: SHIPPED | OUTPATIENT
Start: 2024-02-07 | End: 2024-03-08

## 2024-02-21 ENCOUNTER — TELEMEDICINE (OUTPATIENT)
Dept: PSYCHIATRY | Facility: CLINIC | Age: 24
End: 2024-02-21
Payer: COMMERCIAL

## 2024-02-21 DIAGNOSIS — F43.12 CHRONIC POST-TRAUMATIC STRESS DISORDER (PTSD): ICD-10-CM

## 2024-02-21 DIAGNOSIS — F41.1 GAD (GENERALIZED ANXIETY DISORDER): ICD-10-CM

## 2024-02-21 DIAGNOSIS — F33.1 MODERATE EPISODE OF RECURRENT MAJOR DEPRESSIVE DISORDER: Primary | ICD-10-CM

## 2024-02-21 RX ORDER — BUPROPION HYDROCHLORIDE 300 MG/1
300 TABLET ORAL EVERY MORNING
Qty: 30 TABLET | Refills: 0 | Status: SHIPPED | OUTPATIENT
Start: 2024-02-21 | End: 2024-03-22

## 2024-02-21 NOTE — PROGRESS NOTES
This provider is located at the [facility] (through [facility]), [address], [place] [address], using a secure [employer] Video Visit through [employer]. Patient is being seen remotely via telehealth at their home address in [place], and stated they are in a secure environment for this session. The patient's condition being diagnosed/treated is appropriate for telemedicine. The provider identified herself as well as her credentials.  The patient, and/or patients guardian, consent to be seen remotely, and when consent is given they understand that the consent allows for patient identifiable information to be sent to a third party as needed.   They may refuse to be seen remotely at any time. The electronic data is encrypted and password protected, and the patient and/or guardian has been advised of the potential risks to privacy not withstanding such measures.    You have chosen to receive care through a telehealth visit.  Do you consent to use a video/audio connection for your medical care today? Yes    Patient identifiers utilized: Name and date of birth.    Patient verbally confirmed consent for today's encounter:  February 21, 2024  Lisa Santos is a 23 y.o. female who presents today for follow up    Chief Complaint:    Chief Complaint   Patient presents with    Depression        History of Present Illness:    - At previous visit, Wellbutrin was added to aid in depression/energy/motivation  - Working on getting moved into new apartment. About to  rest of her   - Wellbutrin seems to be helping with her anxiety, feels like it 'levels things out'. She also notices improvement in focus and motivation. She feels like she can focus on 'one thought at a time' compared to before.   - Did have a major stressor of grandmother passing away. She didn't end up going because of all the Je[employer]    Current Medications:  Wellbutrin  mg qday  Zoloft  "50 mg qday     Side Effects: Some irritability on the first few days, but denies new headaches or stomach aches  Sleep: Slightly improved, falling asleep around 1 am, still having a tough time falling asleep at times. Feels like part of this is because she is not as active   Mood: \"Pretty good\", feels like she hasn't been overly sad or angry like she used to be. She still feels she has fairly frequent bad days  SI/HI/AVH: Denies  Overall Function: Improved      The following portions of the patient's history were reviewed and updated as appropriate: allergies, current medications, past family history, past medical history, past social history, past surgical history and problem list.        Past Medical History:  Past Medical History:   Diagnosis Date    Anxiety 2016    Depression 2015    Hypertension 2016       Social History:  Social History     Socioeconomic History    Marital status: Single   Tobacco Use    Smoking status: Never     Passive exposure: Never    Smokeless tobacco: Never   Substance and Sexual Activity    Alcohol use: Never    Drug use: Never    Sexual activity: Yes     Partners: Male     Birth control/protection: Condom, Natural family planning/Rhythm       Family History:  Family History   Problem Relation Age of Onset    Depression Mother     Mental illness Mother     Thyroid disease Mother         Has Hashimoto    Hyperlipidemia Father     Thyroid disease Maternal Grandmother         Has MS and other autoimmune diseases    Diabetes Paternal Grandmother     Hyperlipidemia Paternal Grandmother     Alcohol abuse Maternal Uncle         Passed away due to heart attack from alcoholism    Alcohol abuse Maternal Uncle         Liver failure due to alcoholism    Thyroid disease Maternal Aunt         Has ms and other autoimmune diseases    Thyroid disease Maternal Aunt        Past Surgical History:  History reviewed. No pertinent surgical history.    Problem List:  Patient Active Problem List   Diagnosis    " Essential hypertension    AMELIA (generalized anxiety disorder)    Moderate episode of recurrent major depressive disorder       Allergy:   No Known Allergies     Current Medications:   Current Outpatient Medications   Medication Sig Dispense Refill    buPROPion XL (Wellbutrin XL) 300 MG 24 hr tablet Take 1 tablet by mouth Every Morning for 30 days. 30 tablet 0    NIFEdipine XL (PROCARDIA XL) 60 MG 24 hr tablet Take 1 tablet by mouth Daily. 90 tablet 0    sertraline (Zoloft) 50 MG tablet Take 1 tablet by mouth Daily. 90 tablet 1     No current facility-administered medications for this visit.       Review of Symptoms:    Review of Systems   Psychiatric/Behavioral:  Positive for decreased concentration, depressed mood and stress. Negative for suicidal ideas. The patient is not nervous/anxious.    All other systems reviewed and are negative.      Physical Exam:   Physical Exam  Constitutional:       Appearance: Normal appearance. She is not toxic-appearing.   Neurological:      Mental Status: She is alert.   Psychiatric:         Mood and Affect: Mood normal.         Behavior: Behavior normal.         Thought Content: Thought content normal.         Judgment: Judgment normal.         Vitals:  There were no vitals taken for this visit.   There is no height or weight on file to calculate BMI.    Last 3 Blood Pressure Readings:  BP Readings from Last 3 Encounters:   11/16/23 130/82   09/29/23 150/68   07/10/23 160/80       PHQ-9 Score:   PHQ-9 Total Score: (P) 7     AMELIA-7 Score:   Feeling nervous, anxious or on edge: (P) Not at all  Not being able to stop or control worrying: (P) Several days  Worrying too much about different things: (P) Not at all  Trouble Relaxing: (P) Several days  Being so restless that it is hard to sit still: (P) Not at all  Feeling afraid as if something awful might happen: (P) Several days  Becoming easily annoyed or irritable: (P) Several days  AMELIA 7 Total Score: (P) 4  If you checked any problems,  how difficult have these problems made it for you to do your work, take care of things at home, or get along with other people: (P) Somewhat difficult     Mental Status Exam:   Hygiene:   good  Cooperation:  Cooperative  Eye Contact:  Good  Psychomotor Behavior:  Appropriate  Affect:  Full range  and Appropriate   Mood: euthymic  Hopelessness: Denies  Speech:  Normal  Thought Process:  Goal directed and Linear  Thought Content:  Normal  Suicidal:  None  Homicidal:  None  Hallucinations:  None  Delusion:  None  Memory:  Intact  Orientation:  Grossly intact  Reliability:  good  Insight:  Good  Judgement:  Good  Impulse Control:  Good  Physical/Medical Issues:  Denies       Lab Results:   No visits with results within 3 Month(s) from this visit.   Latest known visit with results is:   Office Visit on 12/12/2022   Component Date Value Ref Range Status    WBC 12/29/2022 8.40  3.40 - 10.80 10*3/mm3 Final    RBC 12/29/2022 5.20  3.77 - 5.28 10*6/mm3 Final    Hemoglobin 12/29/2022 13.3  12.0 - 15.9 g/dL Final    Hematocrit 12/29/2022 41.7  34.0 - 46.6 % Final    MCV 12/29/2022 80.2  79.0 - 97.0 fL Final    MCH 12/29/2022 25.6 (L)  26.6 - 33.0 pg Final    MCHC 12/29/2022 31.9  31.5 - 35.7 g/dL Final    RDW 12/29/2022 14.9  12.3 - 15.4 % Final    Platelets 12/29/2022 347  140 - 450 10*3/mm3 Final    Glucose 12/29/2022 100 (H)  65 - 99 mg/dL Final    BUN 12/29/2022 7  6 - 20 mg/dL Final    Creatinine 12/29/2022 0.62  0.57 - 1.00 mg/dL Final    EGFR Result 12/29/2022 130.9  >60.0 mL/min/1.73 Final    Comment: National Kidney Foundation and American Society of  Nephrology (ASN) Task Force recommended calculation based  on the Chronic Kidney Disease Epidemiology Collaboration  (CKD-EPI) equation refit without adjustment for race.  GFR Normal >60  Chronic Kidney Disease <60  Kidney Failure <15      BUN/Creatinine Ratio 12/29/2022 11.3  7.0 - 25.0 Final    Sodium 12/29/2022 138  136 - 145 mmol/L Final    Potassium 12/29/2022 4.1   3.5 - 5.2 mmol/L Final    Chloride 12/29/2022 106  98 - 107 mmol/L Final    Total CO2 12/29/2022 24.2  22.0 - 29.0 mmol/L Final    Calcium 12/29/2022 9.1  8.6 - 10.5 mg/dL Final    Total Protein 12/29/2022 7.2  6.0 - 8.5 g/dL Final    Albumin 12/29/2022 4.3  3.5 - 5.2 g/dL Final    Globulin 12/29/2022 2.9  gm/dL Final    A/G Ratio 12/29/2022 1.5  g/dL Final    Total Bilirubin 12/29/2022 0.3  0.0 - 1.2 mg/dL Final    Alkaline Phosphatase 12/29/2022 83  39 - 117 U/L Final    AST (SGOT) 12/29/2022 24  1 - 32 U/L Final    ALT (SGPT) 12/29/2022 33  1 - 33 U/L Final    Hemoglobin A1C 12/29/2022 5.40  4.80 - 5.60 % Final    Comment: Hemoglobin A1C Ranges:  Increased Risk for Diabetes  5.7% to 6.4%  Diabetes                     >= 6.5%  Diabetic Goal                < 7.0%      Hep C Virus Ab 12/29/2022 0.2  0.0 - 0.9 s/co ratio Final    Comment:                                   Negative:     < 0.8                               Indeterminate: 0.8 - 0.9                                    Positive:     > 0.9   HCV antibody alone does not differentiate between   previous resolved infection and active infection.   The CDC and current clinical guidelines recommend   that a positive HCV antibody result be followed up   with an HCV RNA test to support the diagnosis of   acute HCV infection. [facility] offers Hepatitis C   Virus (HCV) RNA, Diagnosis, GENEVIEVE (072759) and   Hepatitis C Virus (HCV) Antibody with reflex to   Quantitative Real-time PCR (940621).      Total Cholesterol 12/29/2022 145  0 - 200 mg/dL Final    Comment: Cholesterol Reference Ranges  (U.S. Department of Health and Human Services ATP III  Classifications)  Desirable          <200 mg/dL  Borderline High    200-239 mg/dL  High Risk          >240 mg/dL  Triglyceride Reference Ranges  (U.S. Department of Health and Human Services ATP III  Classifications)  Normal           <150 mg/dL  Borderline High  150-199 mg/dL  High             200-499 mg/dL  Very High        >500  mg/dL  HDL Reference Ranges  (U.S. Department of Health and Human Services ATP III  Classifications)  Low     <40 mg/dl (major risk factor for CHD)  High    >60 mg/dl ('negative' risk factor for CHD)  LDL Reference Ranges  (U.S. Department of Health and Human Services ATP III  Classifications)  Optimal          <100 mg/dL  Near Optimal     100-129 mg/dL  Borderline High  130-159 mg/dL  High             160-189 mg/dL  Very High        >189 mg/dL      Triglycerides 12/29/2022 115  0 - 150 mg/dL Final    HDL Cholesterol 12/29/2022 29 (L)  40 - 60 mg/dL Final    VLDL Cholesterol Ray 12/29/2022 21  5 - 40 mg/dL Final    LDL Chol Calc (NIH) 12/29/2022 95  0 - 100 mg/dL Final    Chol/HDL Ratio 12/29/2022 5.00   Final    TSH 12/29/2022 2.940  0.270 - 4.200 uIU/mL Final    Free T4 12/29/2022 1.10  0.93 - 1.70 ng/dL Final    Results may be falsely increased if patient taking Biotin.    Thyroid Peroxidase Antibody 12/29/2022 9  0 - 34 IU/mL Final    Thyroglobulin Ab 12/29/2022 <1.0  0.0 - 0.9 IU/mL Final    Thyroglobulin Antibody measured by [employer]    Vitamin B-12 12/29/2022 272  211 - 946 pg/mL Final    Results may be falsely increased if patient taking Biotin.    25 Hydroxy, Vitamin D 12/29/2022 12.9 (L)  30.0 - 100.0 ng/ml Final    Comment: Results may be falsely increased if patient taking Biotin.  Reference Range for Total Vitamin D 25(OH)  Deficiency <20.0 ng/mL  Insufficiency 21-29 ng/mL  Sufficiency  ng/mL  Toxicity >100 ng/ml           Assessment & Plan   Diagnoses and all orders for this visit:    1. Moderate episode of recurrent major depressive disorder (Primary)    2. AMELIA (generalized anxiety disorder)    3. Chronic post-traumatic stress disorder (PTSD)    Other orders  -     buPROPion XL (Wellbutrin XL) 300 MG 24 hr tablet; Take 1 tablet by mouth Every Morning for 30 days.  Dispense: 30 tablet; Refill: 0        Visit Diagnoses:    ICD-10-CM ICD-9-CM   1. Moderate episode of recurrent  major depressive disorder  F33.1 296.32   2. AMELIA (generalized anxiety disorder)  F41.1 300.02   3. Chronic post-traumatic stress disorder (PTSD)  F43.12 309.81       Formulation:  22 yo with major trauma history presenting for follow up evaluation and management of anxiety/irritability. Primary concerns are short fuse/irritability, and struggles with motivation/distrust of individuals. Trauma history suggest Complex PTSD as the primary etiology, with patient also meeting criteria for AMELIA and MDD.     Overall symptoms are improved. Patient endorses improves in anxiety, mood and motivation. After discussion, will plan to increase Wellbutrin. Follow up in 1 month         Plan:  #Complex PTSD  #AMELIA  #MDD, moderate, recurrent  - Continue Zoloft 50 mg qday; may consider dcing in future if patient continues to respond well to Wellbutrin.   - Increase Wellbutrin to 300 mg qday for depression/motivation/energy  - Therapy referral placed        Risk Assessment for Suicide/Harm To Self/Others: : Based on patient history, demographics and today's interview, Patient is considered to be at low acute risk for self harm/harm to others.      GOALS:  Short Term Goals: Patient will be compliant with medication, and patient will have no significant medication related side effects.  Patient will be engaged in psychotherapy as indicated.  Patient will report subjective improvement of symptoms.  Long term goals: To stabilize mood and treat/improve subjective symptoms, the patient will stay out of the hospital, the patient will be at an optimal level of functioning, and the patient will take all medications as prescribed.  The patient/guardian verbalized understanding and agreement with goals that were mutually set.         TREATMENT PLAN: Continue supportive psychotherapy efforts and medications as indicated.  Pharmacological and Non-Pharmacological treatment options discussed during today's visit. Patient/Guardian acknowledged and  verbally consented with current treatment plan and was educated on the importance of compliance with treatment and follow-up appointments.      MEDICATION ISSUES:  Discussed medication options and treatment plan of prescribed medication as well as the risks, benefits, any black box warnings, and side effects including potential falls, possible impaired driving, and metabolic adversities among others. Patient is agreeable to call the office with any worsening of symptoms or onset of side effects, or if any concerns or questions arise.  The contact information for the office is made available to the patient. Patient is agreeable to call 911 or go to the nearest ER should they begin having any SI/HI, or if any urgent concerns arise. No medication side effects or related complaints today.     MEDS ORDERED DURING VISIT:  New Medications Ordered This Visit   Medications    buPROPion XL (Wellbutrin XL) 300 MG 24 hr tablet     Sig: Take 1 tablet by mouth Every Morning for 30 days.     Dispense:  30 tablet     Refill:  0       MEDS DISCONTINUED DURING VISIT:   Medications Discontinued During This Encounter   Medication Reason    buPROPion XL (Wellbutrin XL) 150 MG 24 hr tablet Reorder        Follow Up Appointment:   1 month            This document has been electronically signed by Jose Alfredo Weaver MD  February 21, 2024 10:20 EST

## 2024-02-25 DIAGNOSIS — F41.1 GAD (GENERALIZED ANXIETY DISORDER): ICD-10-CM

## 2024-02-25 DIAGNOSIS — F33.1 MODERATE EPISODE OF RECURRENT MAJOR DEPRESSIVE DISORDER: ICD-10-CM

## 2024-02-29 NOTE — TELEPHONE ENCOUNTER
Sent pt a msg that she has 1 available refill at pharmacy    Rx Refill Note  Requested Prescriptions     Pending Prescriptions Disp Refills    sertraline (Zoloft) 50 MG tablet 90 tablet 1     Sig: Take 1 tablet by mouth Daily.      Last office visit with prescribing clinician: 11/16/2023   Last telemedicine visit with prescribing clinician: Visit date not found   Next office visit with prescribing clinician: Visit date not found                         Would you like a call back once the refill request has been completed: [] Yes [] No    If the office needs to give you a call back, can they leave a voicemail: [] Yes [] No    Brenda Oneill, PCT  02/29/24, 08:43 EST

## 2024-03-20 ENCOUNTER — TELEMEDICINE (OUTPATIENT)
Dept: PSYCHIATRY | Facility: CLINIC | Age: 24
End: 2024-03-20
Payer: COMMERCIAL

## 2024-03-20 DIAGNOSIS — F33.1 MODERATE EPISODE OF RECURRENT MAJOR DEPRESSIVE DISORDER: ICD-10-CM

## 2024-03-20 DIAGNOSIS — F41.1 GAD (GENERALIZED ANXIETY DISORDER): ICD-10-CM

## 2024-03-20 RX ORDER — BUPROPION HYDROCHLORIDE 300 MG/1
300 TABLET ORAL EVERY MORNING
Qty: 30 TABLET | Refills: 0 | Status: SHIPPED | OUTPATIENT
Start: 2024-03-20 | End: 2024-04-19

## 2024-03-20 NOTE — PROGRESS NOTES
"This provider is located at the [facility] (through [facility]), [address], [place] [address], using a secure [employer] Video Visit through [employer]. Patient is being seen remotely via telehealth at their home address in [place], and stated they are in a secure environment for this session. The patient's condition being diagnosed/treated is appropriate for telemedicine. The provider identified herself as well as her credentials.  The patient, and/or patients guardian, consent to be seen remotely, and when consent is given they understand that the consent allows for patient identifiable information to be sent to a third party as needed.   They may refuse to be seen remotely at any time. The electronic data is encrypted and password protected, and the patient and/or guardian has been advised of the potential risks to privacy not withstanding such measures.    You have chosen to receive care through a telehealth visit.  Do you consent to use a video/audio connection for your medical care today? Yes    Patient identifiers utilized: Name and date of birth.    Patient verbally confirmed consent for today's encounter:  March 20, 2024  Subjective     Estela Santos is a 23 y.o. female who presents today for follow up    Chief Complaint:    Chief Complaint   Patient presents with    Depression        History of Present Illness:    - Estela Santos is a 23 y.o. patient presenting for follow up of depression/anxiety. At the previous visit, Wellbutrin XL was increased to 300 mg.   - Today, patient is stable. Patient has noticed both pros and cons since increasing her medication dosage. She does feel like she has a lot more energy and is thankful for the appetite suppression. However, she has concerns that the medicine \"blurs her emotions\" and says that she doesn't feel things quite as strong as she used to. She says that she didn't have these issues on her previous medications, " "but also feels like the other medicines did not due as much for her energy levels.  - Patient states that she wonders if her perspective on being happy is a little skewed because she was so 'care free' when she was growing up in [facility], and worries about reaching that level of happiness again.       Current Medications:  Wellbutrin  mg qday  Zoloft 50 mg qday    Side Effects: \"Blurred emotions\", otherwise no major side effects  Sleep: Denies significant sleep concerns  Mood: \"Neutral\"  SI/HI/AVH: Denies  Overall Function: Stable      The following portions of the patient's history were reviewed and updated as appropriate: allergies, current medications, past family history, past medical history, past social history, past surgical history and problem list.        Past Medical History:  Past Medical History:   Diagnosis Date    Anxiety 2016    Depression 2015    Hypertension 2016       Substance Abuse History:   Types:Denies all, including illicit  Withdrawal Symptoms:Denies  Longest Period Sober:Not Applicable   Interest In Treatment: N/A      Social History:  Social History     Socioeconomic History    Marital status: Single   Tobacco Use    Smoking status: Never     Passive exposure: Never    Smokeless tobacco: Never   Substance and Sexual Activity    Alcohol use: Never    Drug use: Never    Sexual activity: Yes     Partners: Male     Birth control/protection: Condom, Natural family planning/Rhythm       Family History:  Family History   Problem Relation Age of Onset    Depression Mother     Mental illness Mother     Thyroid disease Mother         Has Hashimoto    Hyperlipidemia Father     Thyroid disease Maternal Grandmother         Has MS and other autoimmune diseases    Diabetes Paternal Grandmother     Hyperlipidemia Paternal Grandmother     Alcohol abuse Maternal Uncle         Passed away due to heart attack from alcoholism    Alcohol abuse Maternal Uncle         Liver failure due to alcoholism "    Thyroid disease Maternal Aunt         Has ms and other autoimmune diseases    Thyroid disease Maternal Aunt        Past Surgical History:  History reviewed. No pertinent surgical history.    Problem List:  Patient Active Problem List   Diagnosis    Essential hypertension    AMELIA (generalized anxiety disorder)    Moderate episode of recurrent major depressive disorder       Allergy:   No Known Allergies     Current Medications:   Current Outpatient Medications   Medication Sig Dispense Refill    buPROPion XL (Wellbutrin XL) 300 MG 24 hr tablet Take 1 tablet by mouth Every Morning for 30 days. 30 tablet 0    sertraline (Zoloft) 50 MG tablet Take 1 tablet by mouth Daily. 90 tablet 1    NIFEdipine XL (PROCARDIA XL) 60 MG 24 hr tablet Take 1 tablet by mouth Daily. 90 tablet 0     No current facility-administered medications for this visit.       Review of Symptoms:    Review of Systems   Psychiatric/Behavioral:  Negative for suicidal ideas and depressed mood. The patient is nervous/anxious.        Physical Exam:   Physical Exam  Constitutional:       Appearance: Normal appearance. She is not toxic-appearing.   Neurological:      Mental Status: She is alert.   Psychiatric:         Mood and Affect: Mood normal.         Behavior: Behavior normal.         Thought Content: Thought content normal.         Judgment: Judgment normal.         Vitals:  There were no vitals taken for this visit.   There is no height or weight on file to calculate BMI.    Last 3 Blood Pressure Readings:  BP Readings from Last 3 Encounters:   11/16/23 130/82   09/29/23 150/68   07/10/23 160/80       PHQ-9 Score:   PHQ-9 Total Score: (P) 7     AMELIA-7 Score:   Feeling nervous, anxious or on edge: (P) Several days  Not being able to stop or control worrying: (P) Several days  Worrying too much about different things: (P) Several days  Trouble Relaxing: (P) Not at all  Being so restless that it is hard to sit still: (P) Not at all  Feeling afraid as if  "something awful might happen: (P) Not at all  Becoming easily annoyed or irritable: (P) Several days  AMELIA 7 Total Score: (P) 4  If you checked any problems, how difficult have these problems made it for you to do your work, take care of things at home, or get along with other people: (P) Somewhat difficult     Mental Status Exam:   Hygiene:   good  Cooperation:  Cooperative  Eye Contact:  Good  Psychomotor Behavior:  Appropriate  Affect:  Full range  and Appropriate   Mood:  \"Middle\"  Hopelessness: Denies  Speech:  Normal  Thought Process:  Goal directed and Linear  Thought Content:  Normal  Suicidal:  None  Homicidal:  None  Hallucinations:  None  Delusion:  None  Memory:  Intact  Orientation:  Grossly intact  Reliability:  good  Insight:  Fair  Judgement:  Fair  Impulse Control:  Fair  Physical/Medical Issues:  Denies       Lab Results:   No visits with results within 3 Month(s) from this visit.   Latest known visit with results is:   Office Visit on 12/12/2022   Component Date Value Ref Range Status    WBC 12/29/2022 8.40  3.40 - 10.80 10*3/mm3 Final    RBC 12/29/2022 5.20  3.77 - 5.28 10*6/mm3 Final    Hemoglobin 12/29/2022 13.3  12.0 - 15.9 g/dL Final    Hematocrit 12/29/2022 41.7  34.0 - 46.6 % Final    MCV 12/29/2022 80.2  79.0 - 97.0 fL Final    MCH 12/29/2022 25.6 (L)  26.6 - 33.0 pg Final    MCHC 12/29/2022 31.9  31.5 - 35.7 g/dL Final    RDW 12/29/2022 14.9  12.3 - 15.4 % Final    Platelets 12/29/2022 347  140 - 450 10*3/mm3 Final    Glucose 12/29/2022 100 (H)  65 - 99 mg/dL Final    BUN 12/29/2022 7  6 - 20 mg/dL Final    Creatinine 12/29/2022 0.62  0.57 - 1.00 mg/dL Final    EGFR Result 12/29/2022 130.9  >60.0 mL/min/1.73 Final    Comment: National Kidney Foundation and American Society of  Nephrology (ASN) Task Force recommended calculation based  on the Chronic Kidney Disease Epidemiology Collaboration  (CKD-EPI) equation refit without adjustment for race.  GFR Normal >60  Chronic Kidney Disease " <60  Kidney Failure <15      BUN/Creatinine Ratio 12/29/2022 11.3  7.0 - 25.0 Final    Sodium 12/29/2022 138  136 - 145 mmol/L Final    Potassium 12/29/2022 4.1  3.5 - 5.2 mmol/L Final    Chloride 12/29/2022 106  98 - 107 mmol/L Final    Total CO2 12/29/2022 24.2  22.0 - 29.0 mmol/L Final    Calcium 12/29/2022 9.1  8.6 - 10.5 mg/dL Final    Total Protein 12/29/2022 7.2  6.0 - 8.5 g/dL Final    Albumin 12/29/2022 4.3  3.5 - 5.2 g/dL Final    Globulin 12/29/2022 2.9  gm/dL Final    A/G Ratio 12/29/2022 1.5  g/dL Final    Total Bilirubin 12/29/2022 0.3  0.0 - 1.2 mg/dL Final    Alkaline Phosphatase 12/29/2022 83  39 - 117 U/L Final    AST (SGOT) 12/29/2022 24  1 - 32 U/L Final    ALT (SGPT) 12/29/2022 33  1 - 33 U/L Final    Hemoglobin A1C 12/29/2022 5.40  4.80 - 5.60 % Final    Comment: Hemoglobin A1C Ranges:  Increased Risk for Diabetes  5.7% to 6.4%  Diabetes                     >= 6.5%  Diabetic Goal                < 7.0%      Hep C Virus Ab 12/29/2022 0.2  0.0 - 0.9 s/co ratio Final    Comment:                                   Negative:     < 0.8                               Indeterminate: 0.8 - 0.9                                    Positive:     > 0.9   HCV antibody alone does not differentiate between   previous resolved infection and active infection.   The CDC and current clinical guidelines recommend   that a positive HCV antibody result be followed up   with an HCV RNA test to support the diagnosis of   acute HCV infection. [facility] offers Hepatitis C   Virus (HCV) RNA, Diagnosis, GENEVIEVE (637980) and   Hepatitis C Virus (HCV) Antibody with reflex to   Quantitative Real-time PCR ([id number]).      Total Cholesterol 12/29/2022 145  0 - 200 mg/dL Final    Comment: Cholesterol Reference Ranges  (U.S. Department of Health and Human Services ATP III  Classifications)  Desirable          <200 mg/dL  Borderline High    200-239 mg/dL  High Risk          >240 mg/dL  Triglyceride Reference Ranges  (U.S. Department of Health  and Human Services ATP III  Classifications)  Normal           <150 mg/dL  Borderline High  150-199 mg/dL  High             200-499 mg/dL  Very High        >500 mg/dL  HDL Reference Ranges  (U.S. Department of Health and Human Services ATP III  Classifications)  Low     <40 mg/dl (major risk factor for CHD)  High    >60 mg/dl ('negative' risk factor for CHD)  LDL Reference Ranges  (U.S. Department of Health and Human Services ATP III  Classifications)  Optimal          <100 mg/dL  Near Optimal     100-129 mg/dL  Borderline High  130-159 mg/dL  High             160-189 mg/dL  Very High        >189 mg/dL      Triglycerides 12/29/2022 115  0 - 150 mg/dL Final    HDL Cholesterol 12/29/2022 29 (L)  40 - 60 mg/dL Final    VLDL Cholesterol Ray 12/29/2022 21  5 - 40 mg/dL Final    LDL Chol Calc (NIH) 12/29/2022 95  0 - 100 mg/dL Final    Chol/HDL Ratio 12/29/2022 5.00   Final    TSH 12/29/2022 2.940  0.270 - 4.200 uIU/mL Final    Free T4 12/29/2022 1.10  0.93 - 1.70 ng/dL Final    Results may be falsely increased if patient taking Biotin.    Thyroid Peroxidase Antibody 12/29/2022 9  0 - 34 IU/mL Final    Thyroglobulin Ab 12/29/2022 <1.0  0.0 - 0.9 IU/mL Final    Thyroglobulin Antibody measured by [employer] Methodology    Vitamin B-12 12/29/2022 272  211 - 946 pg/mL Final    Results may be falsely increased if patient taking Biotin.    25 Hydroxy, Vitamin D 12/29/2022 12.9 (L)  30.0 - 100.0 ng/ml Final    Comment: Results may be falsely increased if patient taking Biotin.  Reference Range for Total Vitamin D 25(OH)  Deficiency <20.0 ng/mL  Insufficiency 21-29 ng/mL  Sufficiency  ng/mL  Toxicity >100 ng/ml           Assessment & Plan   Diagnoses and all orders for this visit:    1. Moderate episode of recurrent major depressive disorder  -     sertraline (Zoloft) 50 MG tablet; Take 1 tablet by mouth Daily.  Dispense: 90 tablet; Refill: 1    2. AMELIA (generalized anxiety disorder)  -     sertraline (Zoloft) 50 MG  tablet; Take 1 tablet by mouth Daily.  Dispense: 90 tablet; Refill: 1    Other orders  -     buPROPion XL (Wellbutrin XL) 300 MG 24 hr tablet; Take 1 tablet by mouth Every Morning for 30 days.  Dispense: 30 tablet; Refill: 0        Visit Diagnoses:    ICD-10-CM ICD-9-CM   1. Moderate episode of recurrent major depressive disorder  F33.1 296.32   2. AMELIA (generalized anxiety disorder)  F41.1 300.02       Formulation:  24 yo with major trauma history presenting for follow up evaluation and management of anxiety/irritability. Primary concerns are short fuse/irritability, and struggles with motivation/distrust of individuals. Trauma history suggest Complex PTSD as the primary etiology, with patient also meeting criteria for AMELIA and MDD.      Overall symptoms are stable. Discussed pros and cons of current symptom burden vs side effects, and after discussion, patient would like to continue current course for another month before considering further changes.         Plan:  #Complex PTSD  #AMELIA  #MDD, moderate, recurrent  - Continue Zoloft 50 mg qday; may consider dcing in future if patient continues to respond well to Wellbutrin.   - Continue Wellbutrin to 300 mg qday for depression/motivation/energy  - Therapy referral placed, patient has appointment in the next 2 weeks        Risk Assessment for Suicide/Harm To Self/Others: : Based on patient history, demographics and today's interview, Patient is considered to be at low acute risk for self harm/harm to others.      GOALS:  Short Term Goals: Patient will be compliant with medication, and patient will have no significant medication related side effects.  Patient will be engaged in psychotherapy as indicated.  Patient will report subjective improvement of symptoms.  Long term goals: To stabilize mood and treat/improve subjective symptoms, the patient will stay out of the hospital, the patient will be at an optimal level of functioning, and the patient will take all  medications as prescribed.  The patient/guardian verbalized understanding and agreement with goals that were mutually set.      TREATMENT PLAN: Continue supportive psychotherapy efforts and medications as indicated.  Pharmacological and Non-Pharmacological treatment options discussed during today's visit. Patient/Guardian acknowledged and verbally consented with current treatment plan and was educated on the importance of compliance with treatment and follow-up appointments.      MEDICATION ISSUES:  Discussed medication options and treatment plan of prescribed medication as well as the risks, benefits, any black box warnings, and side effects including potential falls, possible impaired driving, and metabolic adversities among others. Patient is agreeable to call the office with any worsening of symptoms or onset of side effects, or if any concerns or questions arise.  The contact information for the office is made available to the patient. Patient is agreeable to call 911 or go to the nearest ER should they begin having any SI/HI, or if any urgent concerns arise. No medication side effects or related complaints today.     MEDS ORDERED DURING VISIT:  New Medications Ordered This Visit   Medications    buPROPion XL (Wellbutrin XL) 300 MG 24 hr tablet     Sig: Take 1 tablet by mouth Every Morning for 30 days.     Dispense:  30 tablet     Refill:  0    sertraline (Zoloft) 50 MG tablet     Sig: Take 1 tablet by mouth Daily.     Dispense:  90 tablet     Refill:  1       MEDS DISCONTINUED DURING VISIT:   Medications Discontinued During This Encounter   Medication Reason    buPROPion XL (Wellbutrin XL) 300 MG 24 hr tablet Reorder    sertraline (Zoloft) 50 MG tablet Reorder        Follow Up Appointment:   1 month           This document has been electronically signed by Jose Alfredo Weaver MD  March 20, 2024 13:01 EDT

## 2024-04-02 ENCOUNTER — TELEMEDICINE (OUTPATIENT)
Dept: PSYCHIATRY | Facility: CLINIC | Age: 24
End: 2024-04-02
Payer: COMMERCIAL

## 2024-04-02 DIAGNOSIS — F43.12 CHRONIC POST-TRAUMATIC STRESS DISORDER (PTSD): ICD-10-CM

## 2024-04-02 DIAGNOSIS — F33.1 MODERATE EPISODE OF RECURRENT MAJOR DEPRESSIVE DISORDER: Primary | ICD-10-CM

## 2024-04-02 DIAGNOSIS — F41.1 GAD (GENERALIZED ANXIETY DISORDER): ICD-10-CM
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
## 2024-04-04 DIAGNOSIS — I10 ESSENTIAL HYPERTENSION: ICD-10-CM

## 2024-04-04 RX ORDER — NIFEDIPINE 60 MG/1
60 TABLET, EXTENDED RELEASE ORAL DAILY
Qty: 90 TABLET | Refills: 0 | Status: SHIPPED | OUTPATIENT
Start: 2024-04-04

## 2024-04-23 ENCOUNTER — TELEMEDICINE (OUTPATIENT)
Dept: PSYCHIATRY | Facility: CLINIC | Age: 24
End: 2024-04-23
Payer: COMMERCIAL

## 2024-04-23 DIAGNOSIS — F43.12 CHRONIC POST-TRAUMATIC STRESS DISORDER (PTSD): ICD-10-CM

## 2024-04-23 DIAGNOSIS — F41.1 GAD (GENERALIZED ANXIETY DISORDER): ICD-10-CM

## 2024-04-23 DIAGNOSIS — F33.1 MODERATE EPISODE OF RECURRENT MAJOR DEPRESSIVE DISORDER: Primary | ICD-10-CM

## 2024-04-23 PROCEDURE — 99214 OFFICE O/P EST MOD 30 MIN: CPT | Performed by: STUDENT IN AN ORGANIZED HEALTH CARE EDUCATION/TRAINING PROGRAM

## 2024-04-23 RX ORDER — BUPROPION HYDROCHLORIDE 150 MG/1
150 TABLET ORAL EVERY MORNING
Qty: 30 TABLET | Refills: 0 | Status: SHIPPED | OUTPATIENT
Start: 2024-04-23 | End: 2024-05-23

## 2024-04-23 RX ORDER — HYDROXYZINE HYDROCHLORIDE 10 MG/1
10 TABLET, FILM COATED ORAL NIGHTLY PRN
Qty: 30 TABLET | Refills: 0 | Status: SHIPPED | OUTPATIENT
Start: 2024-04-23

## 2024-04-23 NOTE — PROGRESS NOTES
This provider is located at the [facility] (through [facility]), [address], [place] [address], using a secure [employer] Video Visit through [employer]. Patient is being seen remotely via telehealth at their home address in [place], and stated they are in a secure environment for this session. The patient's condition being diagnosed/treated is appropriate for telemedicine. The provider identified herself as well as her credentials.  The patient, and/or patients guardian, consent to be seen remotely, and when consent is given they understand that the consent allows for patient identifiable information to be sent to a third party as needed.   They may refuse to be seen remotely at any time. The electronic data is encrypted and password protected, and the patient and/or guardian has been advised of the potential risks to privacy not withstanding such measures.    You have chosen to receive care through a telehealth visit.  Do you consent to use a video/audio connection for your medical care today? Yes    Patient identifiers utilized: Name and date of birth.    Patient verbally confirmed consent for today's encounter:  April 23, 2024  Subjective     Estela Santos is a 23 y.o. female who presents today for follow up    Chief Complaint:    Chief Complaint   Patient presents with    Depression        History of Present Illness:    - Estela Santos is a 23 y.o. patient presenting for follow up of depression. At the previous visit, no changes were made.   - Today, patient is doing okay. No major updates from previous visit. Still working on apartment. Some of her family came to visit pride.   - Patient says she still feels a little numb/flat, but she does feel like she has a lot of motivation compared to before.   - Does feel kind of irritable in the morning. This lasts for about an hour or so in the AM. This started about month ago. She also says she started to take melatonin  "at this time for some storm anxiety she was having. She feels like it does help her sleep      Current Medications:  Wellbutrin  mg qday  Zoloft 50 mg qday.     Side Effects: \"Flatness and numbness\" with wellbutrin.   Sleep: Did start taking Melatonin to go sleep which has helped with sleeping.   Mood: \"Pretty good\"  SI/HI/AVH: Denies, occasionally has passive SI but nothing major  Overall Function: Improved      The following portions of the patient's history were reviewed and updated as appropriate: allergies, current medications, past family history, past medical history, past social history, past surgical history and problem list.        Past Medical History:  Past Medical History:   Diagnosis Date    Anxiety 2016    Depression 2015    Hypertension 2016       Substance Abuse History:   Types:Denies all, including illicit  Withdrawal Symptoms:Denies  Longest Period Sober:Not Applicable   Interest In Treatment: Denies      Social History:  Social History     Socioeconomic History    Marital status: Single   Tobacco Use    Smoking status: Never     Passive exposure: Never    Smokeless tobacco: Never   Substance and Sexual Activity    Alcohol use: Never    Drug use: Never    Sexual activity: Yes     Partners: Male     Birth control/protection: Condom, Natural family planning/Rhythm       Family History:  Family History   Problem Relation Age of Onset    Depression Mother     Mental illness Mother     Thyroid disease Mother         Has Hashimoto    Hyperlipidemia Father     Thyroid disease Maternal Grandmother         Has MS and other autoimmune diseases    Diabetes Paternal Grandmother     Hyperlipidemia Paternal Grandmother     Alcohol abuse Maternal Uncle         Passed away due to heart attack from alcoholism    Alcohol abuse Maternal Uncle         Liver failure due to alcoholism    Thyroid disease Maternal Aunt         Has ms and other autoimmune diseases    Thyroid disease Maternal Aunt        Past " Surgical History:  History reviewed. No pertinent surgical history.    Problem List:  Patient Active Problem List   Diagnosis    Essential hypertension    AMELIA (generalized anxiety disorder)    Moderate episode of recurrent major depressive disorder       Allergy:   No Known Allergies     Current Medications:   Current Outpatient Medications   Medication Sig Dispense Refill    buPROPion XL (Wellbutrin XL) 300 MG 24 hr tablet Take 1 tablet by mouth Every Morning for 30 days. 30 tablet 0    NIFEdipine XL (PROCARDIA XL) 60 MG 24 hr tablet TAKE 1 TABLET BY MOUTH DAILY 90 tablet 0    sertraline (Zoloft) 50 MG tablet Take 1 tablet by mouth Daily. 90 tablet 1     No current facility-administered medications for this visit.       Review of Symptoms:    Review of Systems   Psychiatric/Behavioral:  Negative for sleep disturbance, suicidal ideas and depressed mood. The patient is not nervous/anxious.    All other systems reviewed and are negative.      Physical Exam:   Physical Exam  Constitutional:       Appearance: Normal appearance. She is not toxic-appearing.   Neurological:      Mental Status: She is alert.   Psychiatric:         Mood and Affect: Mood normal.         Behavior: Behavior normal.         Thought Content: Thought content normal.         Judgment: Judgment normal.         Vitals:  There were no vitals taken for this visit.   There is no height or weight on file to calculate BMI.    Last 3 Blood Pressure Readings:  BP Readings from Last 3 Encounters:   11/16/23 130/82   09/29/23 150/68   07/10/23 160/80       PHQ-9 Score:   PHQ-9 Total Score: (P) 6     AMELIA-7 Score:   Feeling nervous, anxious or on edge: (P) Several days  Not being able to stop or control worrying: (P) Not at all  Worrying too much about different things: (P) Several days  Trouble Relaxing: (P) Several days  Being so restless that it is hard to sit still: (P) Not at all  Feeling afraid as if something awful might happen: (P) Not at all  Becoming  easily annoyed or irritable: (P) Several days  AMELIA 7 Total Score: (P) 4  If you checked any problems, how difficult have these problems made it for you to do your work, take care of things at home, or get along with other people: (P) Somewhat difficult     Mental Status Exam:   Hygiene:   good  Cooperation:  Cooperative  Eye Contact:  Good  Psychomotor Behavior:  Appropriate  Affect:  Full range  and Appropriate   Mood: euthymic  Hopelessness: Denies  Speech:  Normal  Thought Process:  Goal directed and Linear  Thought Content:  Normal  Suicidal:  None  Homicidal:  None  Hallucinations:  None  Delusion:  None  Memory:  Intact  Orientation:  Grossly intact  Reliability:  good  Insight:  Fair  Judgement:  Fair  Impulse Control:  Fair  Physical/Medical Issues:  Denies       Lab Results:   No visits with results within 3 Month(s) from this visit.   Latest known visit with results is:   Office Visit on 12/12/2022   Component Date Value Ref Range Status    WBC 12/29/2022 8.40  3.40 - 10.80 10*3/mm3 Final    RBC 12/29/2022 5.20  3.77 - 5.28 10*6/mm3 Final    Hemoglobin 12/29/2022 13.3  12.0 - 15.9 g/dL Final    Hematocrit 12/29/2022 41.7  34.0 - 46.6 % Final    MCV 12/29/2022 80.2  79.0 - 97.0 fL Final    MCH 12/29/2022 25.6 (L)  26.6 - 33.0 pg Final    MCHC 12/29/2022 31.9  31.5 - 35.7 g/dL Final    RDW 12/29/2022 14.9  12.3 - 15.4 % Final    Platelets 12/29/2022 347  140 - 450 10*3/mm3 Final    Glucose 12/29/2022 100 (H)  65 - 99 mg/dL Final    BUN 12/29/2022 7  6 - 20 mg/dL Final    Creatinine 12/29/2022 0.62  0.57 - 1.00 mg/dL Final    EGFR Result 12/29/2022 130.9  >60.0 mL/min/1.73 Final    Comment: National Kidney Foundation and American Society of  Nephrology (ASN) Task Force recommended calculation based  on the Chronic Kidney Disease Epidemiology Collaboration  (CKD-EPI) equation refit without adjustment for race.  GFR Normal >60  Chronic Kidney Disease <60  Kidney Failure <15      BUN/Creatinine Ratio 12/29/2022  11.3  7.0 - 25.0 Final    Sodium 12/29/2022 138  136 - 145 mmol/L Final    Potassium 12/29/2022 4.1  3.5 - 5.2 mmol/L Final    Chloride 12/29/2022 106  98 - 107 mmol/L Final    Total CO2 12/29/2022 24.2  22.0 - 29.0 mmol/L Final    Calcium 12/29/2022 9.1  8.6 - 10.5 mg/dL Final    Total Protein 12/29/2022 7.2  6.0 - 8.5 g/dL Final    Albumin 12/29/2022 4.3  3.5 - 5.2 g/dL Final    Globulin 12/29/2022 2.9  gm/dL Final    A/G Ratio 12/29/2022 1.5  g/dL Final    Total Bilirubin 12/29/2022 0.3  0.0 - 1.2 mg/dL Final    Alkaline Phosphatase 12/29/2022 83  39 - 117 U/L Final    AST (SGOT) 12/29/2022 24  1 - 32 U/L Final    ALT (SGPT) 12/29/2022 33  1 - 33 U/L Final    Hemoglobin A1C 12/29/2022 5.40  4.80 - 5.60 % Final    Comment: Hemoglobin A1C Ranges:  Increased Risk for Diabetes  5.7% to 6.4%  Diabetes                     >= 6.5%  Diabetic Goal                < 7.0%      Hep C Virus Ab 12/29/2022 0.2  0.0 - 0.9 s/co ratio Final    Comment:                                   Negative:     < 0.8                               Indeterminate: 0.8 - 0.9                                    Positive:     > 0.9   HCV antibody alone does not differentiate between   previous resolved infection and active infection.   The CDC and current clinical guidelines recommend   that a positive HCV antibody result be followed up   with an HCV RNA test to support the diagnosis of   acute HCV infection. [facility] offers Hepatitis C   Virus (HCV) RNA, Diagnosis, GENEVIEVE (487906) and   Hepatitis C Virus (HCV) Antibody with reflex to   Quantitative Real-time PCR (401514).      Total Cholesterol 12/29/2022 145  0 - 200 mg/dL Final    Comment: Cholesterol Reference Ranges  (U.S. Department of Health and Human Services ATP III  Classifications)  Desirable          <200 mg/dL  Borderline High    200-239 mg/dL  High Risk          >240 mg/dL  Triglyceride Reference Ranges  (U.S. Department of Health and Human Services ATP III  Classifications)  Normal            <150 mg/dL  Borderline High  150-199 mg/dL  High             200-499 mg/dL  Very High        >500 mg/dL  HDL Reference Ranges  (U.S. Department of Health and Human Services ATP III  Classifications)  Low     <40 mg/dl (major risk factor for CHD)  High    >60 mg/dl ('negative' risk factor for CHD)  LDL Reference Ranges  (U.S. Department of Health and Human Services ATP III  Classifications)  Optimal          <100 mg/dL  Near Optimal     100-129 mg/dL  Borderline High  130-159 mg/dL  High             160-189 mg/dL  Very High        >189 mg/dL      Triglycerides 12/29/2022 115  0 - 150 mg/dL Final    HDL Cholesterol 12/29/2022 29 (L)  40 - 60 mg/dL Final    VLDL Cholesterol Ray 12/29/2022 21  5 - 40 mg/dL Final    LDL Chol Calc (NIH) 12/29/2022 95  0 - 100 mg/dL Final    Chol/HDL Ratio 12/29/2022 5.00   Final    TSH 12/29/2022 2.940  0.270 - 4.200 uIU/mL Final    Free T4 12/29/2022 1.10  0.93 - 1.70 ng/dL Final    Results may be falsely increased if patient taking Biotin.    Thyroid Peroxidase Antibody 12/29/2022 9  0 - 34 IU/mL Final    Thyroglobulin Ab 12/29/2022 <1.0  0.0 - 0.9 IU/mL Final    Thyroglobulin Antibody measured by [facility]    Vitamin B-12 12/29/2022 272  211 - 946 pg/mL Final    Results may be falsely increased if patient taking Biotin.    25 Hydroxy, Vitamin D 12/29/2022 12.9 (L)  30.0 - 100.0 ng/ml Final    Comment: Results may be falsely increased if patient taking Biotin.  Reference Range for Total Vitamin D 25(OH)  Deficiency <20.0 ng/mL  Insufficiency 21-29 ng/mL  Sufficiency  ng/mL  Toxicity >100 ng/ml           Assessment & Plan   Diagnoses and all orders for this visit:    1. Moderate episode of recurrent major depressive disorder (Primary)    2. AMELIA (generalized anxiety disorder)    3. Chronic post-traumatic stress disorder (PTSD)        Visit Diagnoses:    ICD-10-CM ICD-9-CM   1. Moderate episode of recurrent major depressive disorder  F33.1 296.32   2. AMELAI  (generalized anxiety disorder)  F41.1 300.02   3. Chronic post-traumatic stress disorder (PTSD)  F43.12 309.81       Formulation:  22 yo with major trauma history presenting for follow up evaluation and management of anxiety/irritability. Primary concerns are short fuse/irritability, and struggles with motivation/distrust of individuals. Trauma history suggest Complex PTSD as the primary etiology, with patient also meeting criteria for AMELIA and MDD.      Overall symptoms are stable however patient is still feeling 'numb emotions'. After discussion, will trial decreasing Wellbutrin back to 150mg then reassess. Also adding PRN atarax for night time anxiety.         Plan:  #Complex PTSD  #AMELIA  #MDD, moderate, recurrent  - Continue Zoloft 50 mg qday; may consider dcing in future if patient continues to respond well to Wellbutrin.   - Decrease Wellbutrin to 150 mg qday for depression/motivation/energy as patient is endorsing 'numb emotions' at higher dosage  - Continue OTC Melatonin  - Add Hydroxyzine 10 mg PRN anxiety/insomnia  - Continue therapy as scheduled     Risk Assessment for Suicide/Harm To Self/Others: : Based on patient history, demographics and today's interview, Patient is considered to be at low acute risk for self harm/harm to others.      GOALS:  Short Term Goals: Patient will be compliant with medication, and patient will have no significant medication related side effects.  Patient will be engaged in psychotherapy as indicated.  Patient will report subjective improvement of symptoms.  Long term goals: To stabilize mood and treat/improve subjective symptoms, the patient will stay out of the hospital, the patient will be at an optimal level of functioning, and the patient will take all medications as prescribed.  The patient/guardian verbalized understanding and agreement with goals that were mutually set.      TREATMENT PLAN: Continue supportive psychotherapy efforts and medications as indicated.   Pharmacological and Non-Pharmacological treatment options discussed during today's visit. Patient/Guardian acknowledged and verbally consented with current treatment plan and was educated on the importance of compliance with treatment and follow-up appointments.      MEDICATION ISSUES:  Discussed medication options and treatment plan of prescribed medication as well as the risks, benefits, any black box warnings, and side effects including potential falls, possible impaired driving, and metabolic adversities among others. Patient is agreeable to call the office with any worsening of symptoms or onset of side effects, or if any concerns or questions arise.  The contact information for the office is made available to the patient. Patient is agreeable to call 911 or go to the nearest ER should they begin having any SI/HI, or if any urgent concerns arise. No medication side effects or related complaints today.     MEDS ORDERED DURING VISIT:  No orders of the defined types were placed in this encounter.      MEDS DISCONTINUED DURING VISIT:   There are no discontinued medications.     Follow Up Appointment:   1 month           This document has been electronically signed by Jose Alfredo Weaver MD  April 23, 2024 10:03 EDT

## 2024-04-29 ENCOUNTER — TELEMEDICINE (OUTPATIENT)
Dept: PSYCHIATRY | Facility: CLINIC | Age: 24
End: 2024-04-29
Payer: COMMERCIAL

## 2024-04-29 DIAGNOSIS — F41.1 GAD (GENERALIZED ANXIETY DISORDER): ICD-10-CM

## 2024-04-29 DIAGNOSIS — F33.1 MODERATE EPISODE OF RECURRENT MAJOR DEPRESSIVE DISORDER: Primary | ICD-10-CM

## 2024-04-29 PROCEDURE — 90837 PSYTX W PT 60 MINUTES: CPT
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
## 2024-05-08 ENCOUNTER — TELEMEDICINE (OUTPATIENT)
Dept: PSYCHIATRY | Facility: CLINIC | Age: 24
End: 2024-05-08
Payer: COMMERCIAL

## 2024-05-08 DIAGNOSIS — F43.12 CHRONIC POST-TRAUMATIC STRESS DISORDER (PTSD): ICD-10-CM

## 2024-05-08 DIAGNOSIS — F33.1 MODERATE EPISODE OF RECURRENT MAJOR DEPRESSIVE DISORDER: Primary | ICD-10-CM

## 2024-05-08 DIAGNOSIS — F41.1 GAD (GENERALIZED ANXIETY DISORDER): ICD-10-CM

## 2024-05-08 RX ORDER — SERTRALINE HYDROCHLORIDE 100 MG/1
100 TABLET, FILM COATED ORAL DAILY
Qty: 30 TABLET | Refills: 0 | Status: SHIPPED | OUTPATIENT
Start: 2024-05-08

## 2024-05-20 ENCOUNTER — TELEMEDICINE (OUTPATIENT)
Dept: PSYCHIATRY | Facility: CLINIC | Age: 24
End: 2024-05-20
Payer: COMMERCIAL

## 2024-05-20 DIAGNOSIS — F43.12 CHRONIC POST-TRAUMATIC STRESS DISORDER (PTSD): ICD-10-CM

## 2024-05-20 DIAGNOSIS — F33.1 MODERATE EPISODE OF RECURRENT MAJOR DEPRESSIVE DISORDER: Primary | ICD-10-CM

## 2024-05-20 DIAGNOSIS — F41.1 GAD (GENERALIZED ANXIETY DISORDER): ICD-10-CM

## 2024-05-20 PROCEDURE — 90837 PSYTX W PT 60 MINUTES: CPT

## 2024-05-20 NOTE — PROGRESS NOTES
Date: May 20, 2024  Time In: 13:29 EDT  Time out: 2:22 PM EST    This provider is located at [facility], [facility] [place], [place], [facility], using a secure [employer] Video Visit through [employer]. Patient is being seen remotely via telehealth at their home address is located in [place]. Patient stated they are in a secure environment for this session. The patient's condition being diagnosed and treated is appropriate for telemedicine. The provider identified themself as well as their credentials. The patient, or  patient's legal guardian consent to be seen remotely, and when consent is given they understand that the consent allows for patient identifiable information to be sent to a third party as needed. They may refuse to be seen remotely at any time. The electronic data is encrypted and password protected, and the patient's or  legal guardian has been advised of the potential risks to privacy not withstanding such measures.   PT Identifiers used: Name and .    You have chosen to receive care through a telehealth visit.  Do you consent to use a video/audio connection for your medical care today? Yes    Lisa Santos is a 24 y.o. female who presents today for follow up    Chief Complaint:   Chief Complaint   Patient presents with    Anxiety    Depression    PTSD        Data: Pt reported a bad few weeks. Pt reported SI since last session, self harm and went to the ED. Pt identified external and internal triggers that contributed to self harm. Pt reported having some SI with not plan/intent now. Pt and clinician collaborated to identify protective factors, supports to call and coping skills. Clinician dicussed replacement behaviors if needed. Pt will start attending IOP and possibly family therapy with her mother. Pts reported her mother is trigger for her, anger and resentment building throughout her lifetime. Pt reported feeling some isolation as her sisters will not speak  to her currently. Pt is staying with dad, relationship is improving.      Clinical Maneuvering/Intervention: Strength based approach, CBT, processing, validation. Assisted patient in processing above session content; acknowledged and normalized patient’s thoughts, feelings, and concerns.  Rationalized patient thought process regarding concerns presented at session.  Discussed triggers associated with patient's  anxiety , depression , PTSD, and self harm  Also discussed coping skills for patient to implement such as grounding , mindfulness , self care , positive self talk , and journaling, letter to mom, book rec, replacement behaviors    Allowed patient to freely discuss issues without interruption or judgment. Provided safe, confidential environment to facilitate the development of positive therapeutic relationship and encourage open, honest communication. Assisted patient in identifying risk factors which would indicate the need for higher level of care including thoughts to harm self or others and/or self-harming behavior and encouraged patient to contact this office, call 911, or present to the nearest emergency room should any of these events occur. Discussed crisis intervention services and means to access. Patient adamantly and convincingly denies current suicidal or homicidal ideation or perceptual disturbance.    Assessment: Pt arrived on time to session, she was alert and oriented. Pt appears open and honest re MH and SI/self harm. Pt would benefit from IOP, working on this. Pt has adequate support through a few friends, partner and father. Pt appears to feel a sense of rejection from sister not speaking to her. Pt appears to struggle with radical acceptance re mother, making progress. Pt appeared receptive to coping skills, techniques and encouragements dicussed in session. Pt was encouraged to contact supports, [phone], 911 or our office if thoughts changed.    Patient appears to maintain relative stability  as compared to their baseline.  However, patient continues to struggle with   Chief Complaint   Patient presents with    Anxiety    Depression    PTSD    which continues to cause impairment in important areas of functioning.  A result, they can be reasonably expected to continue to benefit from treatment and would likely be at increased risk for decompensation otherwise.      Mental Status Exam:   Hygiene:   good  Cooperation:  Cooperative  Eye Contact:  Good  Psychomotor Behavior:  Appropriate  Affect:  Appropriate  Mood: depressed  Speech:  Normal  Thought Process:  Linear  Thought Content:  Normal  Suicidal:  passive and self harm  Homicidal:  None  Hallucinations:  None  Delusion:  None  Memory:  Intact  Orientation:  Person, Place, and Time  Reliability:  fair  Insight:  Fair  Judgement:  Fair  Impulse Control:  Fair  Physical/Medical Issues:  No        Patient's Support Network Includes:   partner    Functional Status: No impairment    Progress toward goal: Not at goal    Prognosis: Fair with Ongoing Treatment     Plan: FU on SI/self harm. Continue to safety plan with pt. Emotion regulation next visit.    Patient will continue in individual outpatient therapy with focus on improved functioning and coping skills, maintaining stability, and avoiding decompensation and the need for higher level of care.    Patient will adhere to medication regimen as prescribed and report any side effects. Patient will contact this office, call 911 or present to the nearest emergency room should suicidal or homicidal ideations occur. Provide Cognitive Behavioral Therapy and Solution Focused Therapy to improve functioning, maintain stability, and avoid decompensation and the need for higher level of care.     Return in about 1 week (around 5/27/2024).      VISIT DIAGNOSIS:    Diagnosis Plan   1. Moderate episode of recurrent major depressive disorder        2. AMELIA (generalized anxiety disorder)        3. Chronic post-traumatic  stress disorder (PTSD)         13:29 EDT         This document has been electronically signed by Hortencia Manjarrez LCSW  May 20, 2024      Part of this note may be an electronic transcription/translation of spoken language to printed text using the Dragon Dictation System.

## 2024-05-20 NOTE — TREATMENT PLAN
Multi-Disciplinary Problems (from Behavioral Health Treatment Plan)      Active Problems       Problem: Anger  Start Date: 05/20/24      Problem Details: The patient self-scales this problem as a 4 with 10 being the worst.          Goal Priority Start Date Expected End Date End Date    Patient will develop specific, socially acceptable way to manage anger. -- 05/20/24 -- --    Goal Details: Progress toward goal:  Not appropriate to rate progress toward goal since this is the initial treatment plan.          Goal Intervention Frequency Start Date End Date    Process patient's angry feelings or outbursts that have recently occurred and review alternative behaviors PRN 05/20/24 --    Intervention Details: Duration of treatment until until discharged.          Goal Intervention Frequency Start Date End Date    Work with patient to develop constructive way to handle anger. PRN 05/20/24 --    Intervention Details: Duration of treatment until until discharged.                  Problem: Anxiety  Start Date: 05/20/24      Problem Details: The patient self-scales this problem as a 6 with 10 being the worst.          Goal Priority Start Date Expected End Date End Date    Patient will develop and implement behavioral and cognitive strategies to reduce anxiety and irrational fears. -- 05/20/24 -- --    Goal Details: Progress toward goal:  Not appropriate to rate progress toward goal since this is the initial treatment plan.          Goal Intervention Frequency Start Date End Date    Help patient explore past emotional issues in relation to present anxiety. PRN 05/20/24 --    Intervention Details: Duration of treatment until until discharged.          Goal Intervention Frequency Start Date End Date    Help patient develop an awareness of their cognitive and physical responses to anxiety. PRN 05/20/24 --    Intervention Details: Duration of treatment until until discharged.                  Problem: Depression  Start Date:  05/20/24      Problem Details: The patient self-scales this problem as a 6 with 10 being the worst.          Goal Priority Start Date Expected End Date End Date    Patient will demonstrate the ability to initiate new constructive life skills outside of sessions on a consistent basis. -- 05/20/24 -- --    Goal Details: Progress toward goal:  Not appropriate to rate progress toward goal since this is the initial treatment plan.          Goal Intervention Frequency Start Date End Date    Assist patient in setting attainable activities of daily living goals. PRN 05/20/24 --      Goal Intervention Frequency Start Date End Date    Provide education about depression PRN 05/20/24 --    Intervention Details: Duration of treatment until until discharged.          Goal Intervention Frequency Start Date End Date    Assist patient in developing healthy coping strategies. PRN 05/20/24 --    Intervention Details: Duration of treatment until until discharged.                  Problem: Post Traumatic Stress  Start Date: 05/20/24      Problem Details: The patient self-scales this problem as a 5 with 10 being the worst.          Goal Priority Start Date Expected End Date End Date    Patient will process and move through trauma in a way that improves self regard and the patients ability to function optimally in the world around them. -- 05/20/24 -- --    Goal Details: Progress toward goal:  Not appropriate to rate progress toward goal since this is the initial treatment plan.          Goal Intervention Frequency Start Date End Date    Assist patient in identifying ways that trauma has negatively impacted their view of themselves and the world. PRN 05/20/24 --    Intervention Details: Duration of treatment until until discharged.          Goal Intervention Frequency Start Date End Date    Process trauma in the context of the safe session environment. PRN 05/20/24 --    Intervention Details: Duration of treatment until until  discharged.          Goal Intervention Frequency Start Date End Date    Develop a plan of behavior changes that will reduce the stress of the trauma. PRN 05/20/24 --    Intervention Details: Duration of treatment until until discharged.                          Reviewed By       Hortencia Manjarrez LCSW 05/20/24 0557                     I have discussed and reviewed this treatment plan with the patient.

## 2024-05-23 ENCOUNTER — TELEMEDICINE (OUTPATIENT)
Dept: PSYCHIATRY | Facility: CLINIC | Age: 24
End: 2024-05-23
Payer: COMMERCIAL

## 2024-05-23 DIAGNOSIS — F41.1 GAD (GENERALIZED ANXIETY DISORDER): ICD-10-CM

## 2024-05-23 DIAGNOSIS — F43.12 CHRONIC POST-TRAUMATIC STRESS DISORDER (PTSD): ICD-10-CM

## 2024-05-23 DIAGNOSIS — F33.1 MODERATE EPISODE OF RECURRENT MAJOR DEPRESSIVE DISORDER: Primary | ICD-10-CM

## 2024-05-23 RX ORDER — HYDROXYZINE HYDROCHLORIDE 10 MG/1
10-20 TABLET, FILM COATED ORAL NIGHTLY PRN
Qty: 30 TABLET | Refills: 2 | Status: SHIPPED | OUTPATIENT
Start: 2024-05-23

## 2024-05-23 RX ORDER — BUPROPION HYDROCHLORIDE 150 MG/1
150 TABLET ORAL EVERY MORNING
Qty: 30 TABLET | Refills: 2 | Status: SHIPPED | OUTPATIENT
Start: 2024-05-23 | End: 2024-08-21

## 2024-05-23 RX ORDER — SERTRALINE HYDROCHLORIDE 100 MG/1
150 TABLET, FILM COATED ORAL DAILY
Qty: 30 TABLET | Refills: 0 | Status: SHIPPED | OUTPATIENT
Start: 2024-05-23

## 2024-05-23 NOTE — PROGRESS NOTES
This provider is located at the [facility] (through [facility]), [address] [place] [address], [place] [address], using a secure [employer] Video Visit through [employer]. Patient is being seen remotely via telehealth at their home address in [place], and stated they are in a secure environment for this session. The patient's condition being diagnosed/treated is appropriate for telemedicine. The provider identified herself as well as her credentials.  The patient, and/or patients guardian, consent to be seen remotely, and when consent is given they understand that the consent allows for patient identifiable information to be sent to a third party as needed.   They may refuse to be seen remotely at any time. The electronic data is encrypted and password protected, and the patient and/or guardian has been advised of the potential risks to privacy not withstanding such measures.    You have chosen to receive care through a telehealth visit.  Do you consent to use a video/audio connection for your medical care today? Yes    Patient identifiers utilized: Name and date of birth.    Patient verbally confirmed consent for today's encounter:  May 23, 2024  Subjective     Estela Santos is a 24 y.o. female who presents today for follow up    Chief Complaint:    Chief Complaint   Patient presents with    Anxiety    Depression        History of Present Illness:    - Estela Santos is a 24 y.o. patient presenting for follow up of depression. At the previous visit, Zoloft was increased due to some worsening depression/SI   - Today, patient is doing fairly well. Since the last visit, she hasn't noticed a major difference in her mood or anxiety. She says she is using her hydroxyzine more frequently compared to before. She does feel that her emotions are not as bad. Still   - Patient was able to speak with her sisters recently.   - Is going get reevaluated for an IOP in [place]. Will be going through an  "IOP process through [facility].       Current Medications:  Zoloft 100 mg qday  Wellbutrin  mg qday     Side Effects: Denies side effects from the medication  Sleep: Has been having a tougher time getting to sleep, but is now needing hydroxyzine to fall asleep. She says she is still having a tougher time falling asleep, but does stay asleep.   Mood: \"A little bitter\"  SI/HI/AVH: Still prominent, but none are active. Adamantly and convincingly denies current Si  Overall Function: Stable      The following portions of the patient's history were reviewed and updated as appropriate: allergies, current medications, past family history, past medical history, past social history, past surgical history and problem list.        Past Medical History:  Past Medical History:   Diagnosis Date    Anxiety 2016    Depression 2015    Hypertension 2016       Substance Abuse History:   Types:Denies all, including illicit  Withdrawal Symptoms:Denies  Longest Period Sober:Not Applicable   Interest In Treatment: N/A      Social History:  Social History     Socioeconomic History    Marital status: Single   Tobacco Use    Smoking status: Never     Passive exposure: Never    Smokeless tobacco: Never   Substance and Sexual Activity    Alcohol use: Never    Drug use: Never    Sexual activity: Yes     Partners: Male     Birth control/protection: Condom, Natural family planning/Rhythm       Family History:  Family History   Problem Relation Age of Onset    Depression Mother     Mental illness Mother     Thyroid disease Mother         Has Hashimoto    Hyperlipidemia Father     Thyroid disease Maternal Grandmother         Has MS and other autoimmune diseases    Diabetes Paternal Grandmother     Hyperlipidemia Paternal Grandmother     Alcohol abuse Maternal Uncle         Passed away due to heart attack from alcoholism    Alcohol abuse Maternal Uncle         Liver failure due to alcoholism    Thyroid disease Maternal Aunt         Has ms " and other autoimmune diseases    Thyroid disease Maternal Aunt        Past Surgical History:  History reviewed. No pertinent surgical history.    Problem List:  Patient Active Problem List   Diagnosis    Essential hypertension    AMELIA (generalized anxiety disorder)    Moderate episode of recurrent major depressive disorder       Allergy:   No Known Allergies     Current Medications:   Current Outpatient Medications   Medication Sig Dispense Refill    buPROPion XL (Wellbutrin XL) 150 MG 24 hr tablet Take 1 tablet by mouth Every Morning for 30 days. 30 tablet 0    hydrOXYzine (ATARAX) 10 MG tablet Take 1 tablet by mouth At Night As Needed (anxiety and/or sleep). 30 tablet 0    NIFEdipine XL (PROCARDIA XL) 60 MG 24 hr tablet TAKE 1 TABLET BY MOUTH DAILY 90 tablet 0    sertraline (Zoloft) 100 MG tablet Take 1 tablet by mouth Daily. 30 tablet 0     No current facility-administered medications for this visit.       Review of Symptoms:    Review of Systems   Psychiatric/Behavioral:  Positive for sleep disturbance, depressed mood and stress. Negative for suicidal ideas. The patient is nervous/anxious.    All other systems reviewed and are negative.      Physical Exam:   Physical Exam  Constitutional:       Appearance: Normal appearance. She is not toxic-appearing.   Neurological:      Mental Status: She is alert.   Psychiatric:         Mood and Affect: Mood normal.         Behavior: Behavior normal.         Thought Content: Thought content normal.         Judgment: Judgment normal.         Vitals:  There were no vitals taken for this visit.   There is no height or weight on file to calculate BMI.    Last 3 Blood Pressure Readings:  BP Readings from Last 3 Encounters:   11/16/23 130/82   09/29/23 150/68   07/10/23 160/80       PHQ-9 Score:   PHQ-9 Total Score:       AMELIA-7 Score:   Feeling nervous, anxious or on edge: (P) More than half the days  Not being able to stop or control worrying: (P) More than half the days  Worrying  "too much about different things: (P) More than half the days  Trouble Relaxing: (P) More than half the days  Being so restless that it is hard to sit still: (P) Not at all  Feeling afraid as if something awful might happen: (P) Several days  Becoming easily annoyed or irritable: (P) Several days  AMELIA 7 Total Score: (P) 10  If you checked any problems, how difficult have these problems made it for you to do your work, take care of things at home, or get along with other people: (P) Very difficult     Mental Status Exam:   Hygiene:   good  Cooperation:  Cooperative  Eye Contact:  Good  Psychomotor Behavior:  Appropriate  Affect:  Constricted, but improved, did laugh through interview  Mood: \"A little happier\"  Hopelessness: Denies  Speech:  Normal  Thought Process:  Goal directed and Linear  Thought Content:  Normal  Suicidal:  No current SI, adamantly and convincingly denies  Homicidal:  None  Hallucinations:  None  Delusion:  None  Memory:  Intact  Orientation:  Grossly intact  Reliability:  good  Insight:  Fair  Judgement:  Fair  Impulse Control:  Fair  Physical/Medical Issues:  Denies       Lab Results:   No visits with results within 3 Month(s) from this visit.   Latest known visit with results is:   Office Visit on 12/12/2022   Component Date Value Ref Range Status    WBC 12/29/2022 8.40  3.40 - 10.80 10*3/mm3 Final    RBC 12/29/2022 5.20  3.77 - 5.28 10*6/mm3 Final    Hemoglobin 12/29/2022 13.3  12.0 - 15.9 g/dL Final    Hematocrit 12/29/2022 41.7  34.0 - 46.6 % Final    MCV 12/29/2022 80.2  79.0 - 97.0 fL Final    MCH 12/29/2022 25.6 (L)  26.6 - 33.0 pg Final    MCHC 12/29/2022 31.9  31.5 - 35.7 g/dL Final    RDW 12/29/2022 14.9  12.3 - 15.4 % Final    Platelets 12/29/2022 347  140 - 450 10*3/mm3 Final    Glucose 12/29/2022 100 (H)  65 - 99 mg/dL Final    BUN 12/29/2022 7  6 - 20 mg/dL Final    Creatinine 12/29/2022 0.62  0.57 - 1.00 mg/dL Final    EGFR Result 12/29/2022 130.9  >60.0 mL/min/1.73 Final    " Comment: National Kidney Foundation and American Society of  Nephrology (ASN) Task Force recommended calculation based  on the Chronic Kidney Disease Epidemiology Collaboration  (CKD-EPI) equation refit without adjustment for race.  GFR Normal >60  Chronic Kidney Disease <60  Kidney Failure <15      BUN/Creatinine Ratio 12/29/2022 11.3  7.0 - 25.0 Final    Sodium 12/29/2022 138  136 - 145 mmol/L Final    Potassium 12/29/2022 4.1  3.5 - 5.2 mmol/L Final    Chloride 12/29/2022 106  98 - 107 mmol/L Final    Total CO2 12/29/2022 24.2  22.0 - 29.0 mmol/L Final    Calcium 12/29/2022 9.1  8.6 - 10.5 mg/dL Final    Total Protein 12/29/2022 7.2  6.0 - 8.5 g/dL Final    Albumin 12/29/2022 4.3  3.5 - 5.2 g/dL Final    Globulin 12/29/2022 2.9  gm/dL Final    A/G Ratio 12/29/2022 1.5  g/dL Final    Total Bilirubin 12/29/2022 0.3  0.0 - 1.2 mg/dL Final    Alkaline Phosphatase 12/29/2022 83  39 - 117 U/L Final    AST (SGOT) 12/29/2022 24  1 - 32 U/L Final    ALT (SGPT) 12/29/2022 33  1 - 33 U/L Final    Hemoglobin A1C 12/29/2022 5.40  4.80 - 5.60 % Final    Comment: Hemoglobin A1C Ranges:  Increased Risk for Diabetes  5.7% to 6.4%  Diabetes                     >= 6.5%  Diabetic Goal                < 7.0%      Hep C Virus Ab 12/29/2022 0.2  0.0 - 0.9 s/co ratio Final    Comment:                                   Negative:     < 0.8                               Indeterminate: 0.8 - 0.9                                    Positive:     > 0.9   HCV antibody alone does not differentiate between   previous resolved infection and active infection.   The CDC and current clinical guidelines recommend   that a positive HCV antibody result be followed up   with an HCV RNA test to support the diagnosis of   acute HCV infection. [facility] offers Hepatitis C   Virus (HCV) RNA, Diagnosis, GENEVIEVE (524613) and   Hepatitis C Virus (HCV) Antibody with reflex to   Quantitative Real-time PCR (162733).      Total Cholesterol 12/29/2022 145  0 - 200 mg/dL  Final    Comment: Cholesterol Reference Ranges  (U.S. Department of Health and Human Services ATP III  Classifications)  Desirable          <200 mg/dL  Borderline High    200-239 mg/dL  High Risk          >240 mg/dL  Triglyceride Reference Ranges  (U.S. Department of Health and Human Services ATP III  Classifications)  Normal           <150 mg/dL  Borderline High  150-199 mg/dL  High             200-499 mg/dL  Very High        >500 mg/dL  HDL Reference Ranges  (U.S. Department of Health and Human Services ATP III  Classifications)  Low     <40 mg/dl (major risk factor for CHD)  High    >60 mg/dl ('negative' risk factor for CHD)  LDL Reference Ranges  (U.S. Department of Health and Human Services ATP III  Classifications)  Optimal          <100 mg/dL  Near Optimal     100-129 mg/dL  Borderline High  130-159 mg/dL  High             160-189 mg/dL  Very High        >189 mg/dL      Triglycerides 12/29/2022 115  0 - 150 mg/dL Final    HDL Cholesterol 12/29/2022 29 (L)  40 - 60 mg/dL Final    VLDL Cholesterol Ray 12/29/2022 21  5 - 40 mg/dL Final    LDL Chol Calc (NIH) 12/29/2022 95  0 - 100 mg/dL Final    Chol/HDL Ratio 12/29/2022 5.00   Final    TSH 12/29/2022 2.940  0.270 - 4.200 uIU/mL Final    Free T4 12/29/2022 1.10  0.93 - 1.70 ng/dL Final    Results may be falsely increased if patient taking Biotin.    Thyroid Peroxidase Antibody 12/29/2022 9  0 - 34 IU/mL Final    Thyroglobulin Ab 12/29/2022 <1.0  0.0 - 0.9 IU/mL Final    Thyroglobulin Antibody measured by [employer]    Vitamin B-12 12/29/2022 272  211 - 946 pg/mL Final    Results may be falsely increased if patient taking Biotin.    25 Hydroxy, Vitamin D 12/29/2022 12.9 (L)  30.0 - 100.0 ng/ml Final    Comment: Results may be falsely increased if patient taking Biotin.  Reference Range for Total Vitamin D 25(OH)  Deficiency <20.0 ng/mL  Insufficiency 21-29 ng/mL  Sufficiency  ng/mL  Toxicity >100 ng/ml           Assessment & Plan    Diagnoses and all orders for this visit:    1. Moderate episode of recurrent major depressive disorder (Primary)    2. AMELIA (generalized anxiety disorder)    3. Chronic post-traumatic stress disorder (PTSD)        Visit Diagnoses:    ICD-10-CM ICD-9-CM   1. Moderate episode of recurrent major depressive disorder  F33.1 296.32   2. AMELIA (generalized anxiety disorder)  F41.1 300.02   3. Chronic post-traumatic stress disorder (PTSD)  F43.12 309.81       Formulation:  24 yo with major trauma history presenting for follow up evaluation and management of anxiety/irritability. Primary concerns are short fuse/irritability, and struggles with motivation/distrust of individuals. Trauma history suggest Complex PTSD as the primary etiology, with patient also meeting criteria for AMELIA and MDD.      Today patient is stable. No major changes since increase in Zoloft though has had minor improvement in mood. Will plan to further increase Zoloft today. Patient will be evaluated for IOP tomorrow.         Plan:  #Complex PTSD  #AMELIA  #MDD, moderate, recurrent  - Increase Zoloft to 150 mg qday  - Continue  Wellbutrin to 150 mg qday for depression/motivation/energy  - Continue OTC Melatonin  - Continue Hydroxyzine 10 mg PRN anxiety/insomnia, may take up to two tablets  - Continue therapy as scheduled     Risk Assessment for Suicide/Harm To Self/Others: : Based on patient history, demographics and today's interview, Patient is considered to be at low acute risk for self harm/harm to others, though baseline risk is increased compared to general population due to significant psychiatric comorbidity. Protective factors include denial of current SI, future orientation and engagement in treatment.      GOALS:  Short Term Goals: Patient will be compliant with medication, and patient will have no significant medication related side effects.  Patient will be engaged in psychotherapy as indicated.  Patient will report subjective improvement of  symptoms.  Long term goals: To stabilize mood and treat/improve subjective symptoms, the patient will stay out of the hospital, the patient will be at an optimal level of functioning, and the patient will take all medications as prescribed.  The patient/guardian verbalized understanding and agreement with goals that were mutually set.      TREATMENT PLAN: Continue supportive psychotherapy efforts and medications as indicated.  Pharmacological and Non-Pharmacological treatment options discussed during today's visit. Patient/Guardian acknowledged and verbally consented with current treatment plan and was educated on the importance of compliance with treatment and follow-up appointments.      MEDICATION ISSUES:  Discussed medication options and treatment plan of prescribed medication as well as the risks, benefits, any black box warnings, and side effects including potential falls, possible impaired driving, and metabolic adversities among others. Patient is agreeable to call the office with any worsening of symptoms or onset of side effects, or if any concerns or questions arise.  The contact information for the office is made available to the patient. Patient is agreeable to call 911 or go to the nearest ER should they begin having any SI/HI, or if any urgent concerns arise. No medication side effects or related complaints today.     MEDS ORDERED DURING VISIT:  No orders of the defined types were placed in this encounter.      MEDS DISCONTINUED DURING VISIT:   There are no discontinued medications.     Follow Up Appointment:   2 week follow up            This document has been electronically signed by Jose Alfredo Weaver MD  May 23, 2024 14:50 EDT

## 2024-05-29 ENCOUNTER — TELEMEDICINE (OUTPATIENT)
Dept: PSYCHIATRY | Facility: CLINIC | Age: 24
End: 2024-05-29
Payer: COMMERCIAL

## 2024-05-29 DIAGNOSIS — F43.12 CHRONIC POST-TRAUMATIC STRESS DISORDER (PTSD): ICD-10-CM

## 2024-05-29 DIAGNOSIS — F33.1 MODERATE EPISODE OF RECURRENT MAJOR DEPRESSIVE DISORDER: Primary | ICD-10-CM

## 2024-05-29 DIAGNOSIS — F41.1 GAD (GENERALIZED ANXIETY DISORDER): ICD-10-CM

## 2024-05-29 PROCEDURE — 90837 PSYTX W PT 60 MINUTES: CPT

## 2024-05-29 NOTE — PROGRESS NOTES
Date: May 29, 2024  Time In: 14:29 EDT  Time out: 3:25 PM EST    This provider is located at [facility], [address], [place], [facility], using a secure [employer] Video Visit through [employer]. Patient is being seen remotely via telehealth at their home address is located in [place]. Patient stated they are in a secure environment for this session. The patient's condition being diagnosed and treated is appropriate for telemedicine. The provider identified themself as well as their credentials. The patient, or  patient's legal guardian consent to be seen remotely, and when consent is given they understand that the consent allows for patient identifiable information to be sent to a third party as needed. They may refuse to be seen remotely at any time. The electronic data is encrypted and password protected, and the patient's or  legal guardian has been advised of the potential risks to privacy not withstanding such measures.   PT Identifiers used: Name and .    You have chosen to receive care through a telehealth visit.  Do you consent to use a video/audio connection for your medical care today? Yes    Subjective   Estela Santos is a 24 y.o. female who presents today for follow up    Chief Complaint:   Chief Complaint   Patient presents with    Depression    PTSD    Anxiety        Data: Pt reported doing okay. Pt reported feeling isolated at dads as he works often. Pt reported some contact with sister. Pt reported mother still wants to try family therapy to aid with their relationship. Pt reported struggling with abandonment and rejection feelings. Pt reported occupying time with hobbies, coping skills and distractions. Pt reported reading book rec, enjoying this and able to feel validated. Pt reported passive SI and self harm. Pt reported feeling hesitant about IOP but willing to contact the  to start virtually. Pt was able to identify protective factors with  clinician.      Clinical Maneuvering/Intervention: Validation, CBT techniques, strength based approach, MI. Assisted patient in processing above session content; acknowledged and normalized patient’s thoughts, feelings, and concerns.  Rationalized patient thought process regarding concerns presented at session.  Discussed triggers associated with patient's  anxiety , depression , and PTSD Also discussed coping skills for patient to implement such as grounding , mindfulness , self care , positive self talk , and self esteem journal, facts vs feelings, challenging thought process    Allowed patient to freely discuss issues without interruption or judgment. Provided safe, confidential environment to facilitate the development of positive therapeutic relationship and encourage open, honest communication. Assisted patient in identifying risk factors which would indicate the need for higher level of care including thoughts to harm self or others and/or self-harming behavior and encouraged patient to contact this office, call 911, or present to the nearest emergency room should any of these events occur. Discussed crisis intervention services and means to access. Patient adamantly and convincingly denies current  homicidal ideation or perceptual disturbance.    Assessment: Pt arrived on time, she was alert and oriented. Pt continues to struggle with passive SI and self harm thoughts, pt appears open and honest re thoughts and informs support when active. Pt appeared receptive to contacting emergency services or our office if thoughts increased. Pt has protective factors, some could be improved. Pt  appeared receptive to encouragements and techniques dicussed in session. Pt would benefit from IOP and EMDR therapy. Pt will start weekly therapy.     Patient appears to maintain relative stability as compared to their baseline.  However, patient continues to struggle with   Chief Complaint   Patient presents with    Depression     PTSD    Anxiety    which continues to cause impairment in important areas of functioning.  A result, they can be reasonably expected to continue to benefit from treatment and would likely be at increased risk for decompensation otherwise.      Mental Status Exam:   Hygiene:   good  Cooperation:  Cooperative  Eye Contact:  Good  Psychomotor Behavior:  Appropriate  Affect:  Appropriate  Mood: normal  Speech:  Normal  Thought Process:  Linear  Thought Content:  Normal  Suicidal:   self harm thoughts with no plan  Homicidal:  None  Hallucinations:  None  Delusion:  None  Memory:  Intact  Orientation:  Person, Place, and Time  Reliability:  fair  Insight:  Fair  Judgement:  Fair  Impulse Control:  Fair  Physical/Medical Issues:  No        Patient's Support Network Includes:   partner, dad    Functional Status: No impairment    Progress toward goal: Not at goal    Prognosis: Fair with Ongoing Treatment     Plan: FU on IOP plan, interrupting negative thought process, new coping skills to reframe mindset. Assess SI and self harm as needed.    Patient will continue in individual outpatient therapy with focus on improved functioning and coping skills, maintaining stability, and avoiding decompensation and the need for higher level of care.    Patient will adhere to medication regimen as prescribed and report any side effects. Patient will contact this office, call 911 or present to the nearest emergency room should suicidal or homicidal ideations occur. Provide Cognitive Behavioral Therapy and Solution Focused Therapy to improve functioning, maintain stability, and avoid decompensation and the need for higher level of care.     Return in about 1 week (around 6/5/2024).      VISIT DIAGNOSIS:    Diagnosis Plan   1. Moderate episode of recurrent major depressive disorder        2. AMELIA (generalized anxiety disorder)        3. Chronic post-traumatic stress disorder (PTSD)         14:29 EDT         This document has been  electronically signed by Hortencia Manjarrez LCSW  May 29, 2024      Part of this note may be an electronic transcription/translation of spoken language to printed text using the Dragon Dictation System.

## 2024-06-04 ENCOUNTER — TELEMEDICINE (OUTPATIENT)
Dept: PSYCHIATRY | Facility: CLINIC | Age: 24
End: 2024-06-04
Payer: COMMERCIAL

## 2024-06-04 DIAGNOSIS — F33.1 MODERATE EPISODE OF RECURRENT MAJOR DEPRESSIVE DISORDER: Primary | ICD-10-CM

## 2024-06-04 DIAGNOSIS — F41.1 GAD (GENERALIZED ANXIETY DISORDER): ICD-10-CM

## 2024-06-04 DIAGNOSIS — F43.12 CHRONIC POST-TRAUMATIC STRESS DISORDER (PTSD): ICD-10-CM

## 2024-06-04 PROCEDURE — 90837 PSYTX W PT 60 MINUTES: CPT

## 2024-06-04 NOTE — PROGRESS NOTES
Date: 2024  Time In: 17:00 EDT  Time out: 5:53 PM EST    This provider is located at [facility], [facility] [place], [place], [facility], using a secure [employer] Video Visit through [employer]. Patient is being seen remotely via telehealth at their home address is located in [place]. Patient stated they are in a secure environment for this session. The patient's condition being diagnosed and treated is appropriate for telemedicine. The provider identified themself as well as their credentials. The patient, or  patient's legal guardian consent to be seen remotely, and when consent is given they understand that the consent allows for patient identifiable information to be sent to a third party as needed. They may refuse to be seen remotely at any time. The electronic data is encrypted and password protected, and the patient's or  legal guardian has been advised of the potential risks to privacy not withstanding such measures.   PT Identifiers used: Name and .    You have chosen to receive care through a telehealth visit.  Do you consent to use a video/audio connection for your medical care today? Yes    Subjective   Estela Santos is a 24 y.o. female who presents today for follow up    Chief Complaint:   Chief Complaint   Patient presents with    Anxiety    Depression    PTSD        Data:  Pt reported having a better week than last week. Pt reported spending time with her mom and able to regulate emotions. Increasing support by contacting peers. Pt reported advocating to her family when needed. Pt will start family therapy with mom soon. Pt reported some resentment towards sister due to circumstances and lack of empathy.      Clinical Maneuvering/Intervention: DBT skills, processing, validation. Assisted patient in processing above session content; acknowledged and normalized patient’s thoughts, feelings, and concerns.  Rationalized patient thought process regarding concerns presented  at session.  Discussed triggers associated with patient's  anxiety , depression , PTSD, and anger  Also discussed coping skills for patient to implement such as grounding , mindfulness , self care , positive self talk , and opposite action, checking the facts.    Allowed patient to freely discuss issues without interruption or judgment. Provided safe, confidential environment to facilitate the development of positive therapeutic relationship and encourage open, honest communication. Assisted patient in identifying risk factors which would indicate the need for higher level of care including thoughts to harm self or others and/or self-harming behavior and encouraged patient to contact this office, call 911, or present to the nearest emergency room should any of these events occur. Discussed crisis intervention services and means to access. Patient adamantly and convincingly denies current suicidal or homicidal ideation or perceptual disturbance.    Assessment: Pt arrived on time, she was alert and oriented. Pt appears to want to improve relationships and increase healthy support. Pt has some unresolved anger towards mom and sister. Pt may benefit from family therapy with mom. Pt appears aware of emotions and effect they can have on behavior -has struggled to interrupt in the past. Pt appeared receptive to coping skills and techniques dicussed in session.    Patient appears to maintain relative stability as compared to their baseline.  However, patient continues to struggle with   Chief Complaint   Patient presents with    Anxiety    Depression    PTSD    which continues to cause impairment in important areas of functioning.  A result, they can be reasonably expected to continue to benefit from treatment and would likely be at increased risk for decompensation otherwise.      Mental Status Exam:   Hygiene:   good  Cooperation:  Cooperative  Eye Contact:  Good  Psychomotor Behavior:  Appropriate  Affect:   Appropriate  Mood: normal  Speech:  Normal  Thought Process:  Linear  Thought Content:  Normal  Suicidal:  None  Homicidal:  None  Hallucinations:  None  Delusion:  None  Memory:  Intact  Orientation:  Person, Place, and Time  Reliability:  fair  Insight:  Fair  Judgement:  Fair  Impulse Control:  Fair  Physical/Medical Issues:  No        Patient's Support Network Includes:  significant other and father    Functional Status: No impairment    Progress toward goal: Not at goal    Prognosis: Fair with Ongoing Treatment     Plan:     Patient will continue in individual outpatient therapy with focus on improved functioning and coping skills, maintaining stability, and avoiding decompensation and the need for higher level of care.    Patient will adhere to medication regimen as prescribed and report any side effects. Patient will contact this office, call 911 or present to the nearest emergency room should suicidal or homicidal ideations occur. Provide Cognitive Behavioral Therapy and Solution Focused Therapy to improve functioning, maintain stability, and avoid decompensation and the need for higher level of care.     Return in about 2 weeks (around 6/18/2024).      VISIT DIAGNOSIS:    Diagnosis Plan   1. Moderate episode of recurrent major depressive disorder        2. AMELIA (generalized anxiety disorder)        3. Chronic post-traumatic stress disorder (PTSD)         17:00 EDT         This document has been electronically signed by Hortencia Manjarrez LCSW  June 4, 2024      Part of this note may be an electronic transcription/translation of spoken language to printed text using the Dragon Dictation System.

## 2024-06-06 ENCOUNTER — TELEMEDICINE (OUTPATIENT)
Dept: PSYCHIATRY | Facility: CLINIC | Age: 24
End: 2024-06-06
Payer: COMMERCIAL

## 2024-06-06 DIAGNOSIS — F41.1 GAD (GENERALIZED ANXIETY DISORDER): ICD-10-CM

## 2024-06-06 DIAGNOSIS — F43.12 CHRONIC POST-TRAUMATIC STRESS DISORDER (PTSD): ICD-10-CM

## 2024-06-06 DIAGNOSIS — F33.1 MODERATE EPISODE OF RECURRENT MAJOR DEPRESSIVE DISORDER: Primary | ICD-10-CM

## 2024-06-06 NOTE — PROGRESS NOTES
This provider is located at the [facility] (through [facility]), [address], [place] [address], using a secure [employer] Video Visit through [employer]. Patient is being seen remotely via telehealth at their home address in [place], and stated they are in a secure environment for this session. The patient's condition being diagnosed/treated is appropriate for telemedicine. The provider identified herself as well as her credentials.  The patient, and/or patients guardian, consent to be seen remotely, and when consent is given they understand that the consent allows for patient identifiable information to be sent to a third party as needed.   They may refuse to be seen remotely at any time. The electronic data is encrypted and password protected, and the patient and/or guardian has been advised of the potential risks to privacy not withstanding such measures.    You have chosen to receive care through a telehealth visit.  Do you consent to use a video/audio connection for your medical care today? Yes    Patient identifiers utilized: Name and date of birth.    Patient verbally confirmed consent for today's encounter:  June 6, 2024  Subjective     Estela Santos is a 24 y.o. female who presents today for follow up    Chief Complaint:    Chief Complaint   Patient presents with    Depression        History of Present Illness:    - Estela Santos is a 24 y.o. patient presenting for follow up of depression. At the previous visit, Zoloft was increased to 150 mg qday. No major new developments since the last visit. Still living with father and visiting her partner in [place]  - Patient has been hanging out with mother a little bit more, they are trying to set some boundaries which she feels has been helpful. They visited her aunts ciarra which she felt was nice closure for her. Also currently working on his driving  - Today, patient is doing fairly well. She does feel like  "her anxiety is a little bit better than before. She finds that its easier for her to be around people, doesn't feel like she panics as much as she was before.       Current Medications:  Zoloft 150 mg qday  Wellbutrin 150 mg qday  Atarax 10 mg PRN insomnia- Did try a double dosage, but she says she woke up and felt numb and sedated, also endorses some flash backs when this happened    Side Effects: Some headaches, but these are improving  Sleep: Pretty good since starting Atarax  Mood: \"Okay\"  SI/HI/AVH: Not as many as before, 4-5 in the last 2 weeks. Last thoughts were Monday or Tuesday. These last a few hours, but redirects well.   Overall Function: Okay      The following portions of the patient's history were reviewed and updated as appropriate: allergies, current medications, past family history, past medical history, past social history, past surgical history and problem list.        Past Medical History:  Past Medical History:   Diagnosis Date    Anxiety 2016    Depression 2015    Hypertension 2016       Substance Abuse History:   Types:Denies all, including illicit  Withdrawal Symptoms:Denies  Longest Period Sober:Not Applicable   Interest In Treatment: N/A      Social History:  Social History     Socioeconomic History    Marital status: Single   Tobacco Use    Smoking status: Never     Passive exposure: Never    Smokeless tobacco: Never   Substance and Sexual Activity    Alcohol use: Never    Drug use: Never    Sexual activity: Yes     Partners: Male     Birth control/protection: Condom, Natural family planning/Rhythm       Family History:  Family History   Problem Relation Age of Onset    Depression Mother     Mental illness Mother     Thyroid disease Mother         Has Hashimoto    Hyperlipidemia Father     Thyroid disease Maternal Grandmother         Has MS and other autoimmune diseases    Diabetes Paternal Grandmother     Hyperlipidemia Paternal Grandmother     Alcohol abuse Maternal Uncle         " Passed away due to heart attack from alcoholism    Alcohol abuse Maternal Uncle         Liver failure due to alcoholism    Thyroid disease Maternal Aunt         Has ms and other autoimmune diseases    Thyroid disease Maternal Aunt        Past Surgical History:  History reviewed. No pertinent surgical history.    Problem List:  Patient Active Problem List   Diagnosis    Essential hypertension    AMELIA (generalized anxiety disorder)    Moderate episode of recurrent major depressive disorder       Allergy:   No Known Allergies     Current Medications:   Current Outpatient Medications   Medication Sig Dispense Refill    buPROPion XL (Wellbutrin XL) 150 MG 24 hr tablet Take 1 tablet by mouth Every Morning for 90 days. 30 tablet 2    hydrOXYzine (ATARAX) 10 MG tablet Take 1-2 tablets by mouth At Night As Needed (anxiety and/or sleep). 30 tablet 2    NIFEdipine XL (PROCARDIA XL) 60 MG 24 hr tablet TAKE 1 TABLET BY MOUTH DAILY 90 tablet 0    sertraline (Zoloft) 100 MG tablet Take 1.5 tablets by mouth Daily. 30 tablet 0     No current facility-administered medications for this visit.       Review of Symptoms:    Review of Systems   Psychiatric/Behavioral:  Positive for depressed mood. Negative for suicidal ideas. The patient is nervous/anxious.    All other systems reviewed and are negative.      Physical Exam:   Physical Exam  Constitutional:       Appearance: Normal appearance. She is not toxic-appearing.   Neurological:      Mental Status: She is alert.   Psychiatric:         Mood and Affect: Mood normal.         Behavior: Behavior normal.         Thought Content: Thought content normal.         Judgment: Judgment normal.         Vitals:  There were no vitals taken for this visit.   There is no height or weight on file to calculate BMI.    Last 3 Blood Pressure Readings:  BP Readings from Last 3 Encounters:   11/16/23 130/82   09/29/23 150/68   07/10/23 160/80       PHQ-9 Score:   PHQ-9 Total Score: (P) 15     AMELIA-7 Score:    Feeling nervous, anxious or on edge: (P) Several days  Not being able to stop or control worrying: (P) More than half the days  Worrying too much about different things: (P) More than half the days  Trouble Relaxing: (P) Several days  Being so restless that it is hard to sit still: (P) Not at all  Feeling afraid as if something awful might happen: (P) Not at all  Becoming easily annoyed or irritable: (P) Several days  AMELIA 7 Total Score: (P) 7  If you checked any problems, how difficult have these problems made it for you to do your work, take care of things at home, or get along with other people: (P) Somewhat difficult     Mental Status Exam:   Hygiene:   good  Cooperation:  Cooperative  Eye Contact:  Good  Psychomotor Behavior:  Appropriate  Affect:  Full range  and Appropriate   Mood: fluctates  Hopelessness: Denies  Speech:  Normal  Thought Process:  Goal directed and Linear  Thought Content:  Normal  Suicidal:  None  Homicidal:  None  Hallucinations:  None  Delusion:  None  Memory:  Intact  Orientation:  Grossly intact  Reliability:  good  Insight:  Fair  Judgement:  Fair  Impulse Control:  Fair  Physical/Medical Issues:  Denies       Lab Results:   No visits with results within 3 Month(s) from this visit.   Latest known visit with results is:   Office Visit on 12/12/2022   Component Date Value Ref Range Status    WBC 12/29/2022 8.40  3.40 - 10.80 10*3/mm3 Final    RBC 12/29/2022 5.20  3.77 - 5.28 10*6/mm3 Final    Hemoglobin 12/29/2022 13.3  12.0 - 15.9 g/dL Final    Hematocrit 12/29/2022 41.7  34.0 - 46.6 % Final    MCV 12/29/2022 80.2  79.0 - 97.0 fL Final    MCH 12/29/2022 25.6 (L)  26.6 - 33.0 pg Final    MCHC 12/29/2022 31.9  31.5 - 35.7 g/dL Final    RDW 12/29/2022 14.9  12.3 - 15.4 % Final    Platelets 12/29/2022 347  140 - 450 10*3/mm3 Final    Glucose 12/29/2022 100 (H)  65 - 99 mg/dL Final    BUN 12/29/2022 7  6 - 20 mg/dL Final    Creatinine 12/29/2022 0.62  0.57 - 1.00 mg/dL Final    EGFR Result  12/29/2022 130.9  >60.0 mL/min/1.73 Final    Comment: National Kidney Foundation and American Society of  Nephrology (ASN) Task Force recommended calculation based  on the Chronic Kidney Disease Epidemiology Collaboration  (CKD-EPI) equation refit without adjustment for race.  GFR Normal >60  Chronic Kidney Disease <60  Kidney Failure <15      BUN/Creatinine Ratio 12/29/2022 11.3  7.0 - 25.0 Final    Sodium 12/29/2022 138  136 - 145 mmol/L Final    Potassium 12/29/2022 4.1  3.5 - 5.2 mmol/L Final    Chloride 12/29/2022 106  98 - 107 mmol/L Final    Total CO2 12/29/2022 24.2  22.0 - 29.0 mmol/L Final    Calcium 12/29/2022 9.1  8.6 - 10.5 mg/dL Final    Total Protein 12/29/2022 7.2  6.0 - 8.5 g/dL Final    Albumin 12/29/2022 4.3  3.5 - 5.2 g/dL Final    Globulin 12/29/2022 2.9  gm/dL Final    A/G Ratio 12/29/2022 1.5  g/dL Final    Total Bilirubin 12/29/2022 0.3  0.0 - 1.2 mg/dL Final    Alkaline Phosphatase 12/29/2022 83  39 - 117 U/L Final    AST (SGOT) 12/29/2022 24  1 - 32 U/L Final    ALT (SGPT) 12/29/2022 33  1 - 33 U/L Final    Hemoglobin A1C 12/29/2022 5.40  4.80 - 5.60 % Final    Comment: Hemoglobin A1C Ranges:  Increased Risk for Diabetes  5.7% to 6.4%  Diabetes                     >= 6.5%  Diabetic Goal                < 7.0%      Hep C Virus Ab 12/29/2022 0.2  0.0 - 0.9 s/co ratio Final    Comment:                                   Negative:     < 0.8                               Indeterminate: 0.8 - 0.9                                    Positive:     > 0.9   HCV antibody alone does not differentiate between   previous resolved infection and active infection.   The CDC and current clinical guidelines recommend   that a positive HCV antibody result be followed up   with an HCV RNA test to support the diagnosis of   acute HCV infection. [facility] offers Hepatitis C   Virus (HCV) RNA, Diagnosis, GENEVIEVE (612366) and   Hepatitis C Virus (HCV) Antibody with reflex to   Quantitative Real-time PCR ([id number]).      Total  Cholesterol 12/29/2022 145  0 - 200 mg/dL Final    Comment: Cholesterol Reference Ranges  (U.S. Department of Health and Human Services ATP III  Classifications)  Desirable          <200 mg/dL  Borderline High    200-239 mg/dL  High Risk          >240 mg/dL  Triglyceride Reference Ranges  (U.S. Department of Health and Human Services ATP III  Classifications)  Normal           <150 mg/dL  Borderline High  150-199 mg/dL  High             200-499 mg/dL  Very High        >500 mg/dL  HDL Reference Ranges  (U.S. Department of Health and Human Services ATP III  Classifications)  Low     <40 mg/dl (major risk factor for CHD)  High    >60 mg/dl ('negative' risk factor for CHD)  LDL Reference Ranges  (U.S. Department of Health and Human Services ATP III  Classifications)  Optimal          <100 mg/dL  Near Optimal     100-129 mg/dL  Borderline High  130-159 mg/dL  High             160-189 mg/dL  Very High        >189 mg/dL      Triglycerides 12/29/2022 115  0 - 150 mg/dL Final    HDL Cholesterol 12/29/2022 29 (L)  40 - 60 mg/dL Final    VLDL Cholesterol Ray 12/29/2022 21  5 - 40 mg/dL Final    LDL Chol Calc (NIH) 12/29/2022 95  0 - 100 mg/dL Final    Chol/HDL Ratio 12/29/2022 5.00   Final    TSH 12/29/2022 2.940  0.270 - 4.200 uIU/mL Final    Free T4 12/29/2022 1.10  0.93 - 1.70 ng/dL Final    Results may be falsely increased if patient taking Biotin.    Thyroid Peroxidase Antibody 12/29/2022 9  0 - 34 IU/mL Final    Thyroglobulin Ab 12/29/2022 <1.0  0.0 - 0.9 IU/mL Final    Thyroglobulin Antibody measured by Hudl Methodology    Vitamin B-12 12/29/2022 272  211 - 946 pg/mL Final    Results may be falsely increased if patient taking Biotin.    25 Hydroxy, Vitamin D 12/29/2022 12.9 (L)  30.0 - 100.0 ng/ml Final    Comment: Results may be falsely increased if patient taking Biotin.  Reference Range for Total Vitamin D 25(OH)  Deficiency <20.0 ng/mL  Insufficiency 21-29 ng/mL  Sufficiency  ng/mL  Toxicity >100  ng/ml           Assessment & Plan   Diagnoses and all orders for this visit:    1. Moderate episode of recurrent major depressive disorder (Primary)    2. AMELIA (generalized anxiety disorder)    3. Chronic post-traumatic stress disorder (PTSD)        Visit Diagnoses:    ICD-10-CM ICD-9-CM   1. Moderate episode of recurrent major depressive disorder  F33.1 296.32   2. AMELIA (generalized anxiety disorder)  F41.1 300.02   3. Chronic post-traumatic stress disorder (PTSD)  F43.12 309.81       Formulation:  22 yo with major trauma history presenting for follow up evaluation and management of anxiety/irritability. Primary concerns are short fuse/irritability, and struggles with motivation/distrust of individuals. Trauma history suggest Complex PTSD as the primary etiology, with patient also meeting criteria for AMELIA and MDD.      Today patient is stable. He has noticed a mild improvement in anxiety since increasing Zoloft, still in a similar mood. One concerning symptoms she mentioned was some drowsiness and some flashbacks when taking her hydroxyzine at night. Encouraged her to avoid the higher dosage, and we will discuss alternatives at her next follow up.         Plan:  #Complex PTSD  #AMELIA  #MDD, moderate, recurrent  - Continue Zoloft to 150 mg qday  - Continue  Wellbutrin to 150 mg qday for depression/motivation/energy  - Continue OTC Melatonin  - Continue Hydroxyzine 10 mg PRN anxiety/insomnia, discuss transition to Trazodone at next follow up.   - Continue therapy as scheduled     Risk Assessment for Suicide/Harm To Self/Others: : Based on patient history, demographics and today's interview, Patient is considered to be at low acute risk for self harm/harm to others, though baseline risk is increased compared to general population due to significant psychiatric comorbidity. Protective factors include denial of current SI, future orientation and engagement in treatment.      GOALS:  Short Term Goals: Patient will be  compliant with medication, and patient will have no significant medication related side effects.  Patient will be engaged in psychotherapy as indicated.  Patient will report subjective improvement of symptoms.  Long term goals: To stabilize mood and treat/improve subjective symptoms, the patient will stay out of the hospital, the patient will be at an optimal level of functioning, and the patient will take all medications as prescribed.  The patient/guardian verbalized understanding and agreement with goals that were mutually set.         TREATMENT PLAN: Continue supportive psychotherapy efforts and medications as indicated.  Pharmacological and Non-Pharmacological treatment options discussed during today's visit. Patient/Guardian acknowledged and verbally consented with current treatment plan and was educated on the importance of compliance with treatment and follow-up appointments.      MEDICATION ISSUES:  Discussed medication options and treatment plan of prescribed medication as well as the risks, benefits, any black box warnings, and side effects including potential falls, possible impaired driving, and metabolic adversities among others. Patient is agreeable to call the office with any worsening of symptoms or onset of side effects, or if any concerns or questions arise.  The contact information for the office is made available to the patient. Patient is agreeable to call 911 or go to the nearest ER should they begin having any SI/HI, or if any urgent concerns arise. No medication side effects or related complaints today.     MEDS ORDERED DURING VISIT:  No orders of the defined types were placed in this encounter.      MEDS DISCONTINUED DURING VISIT:   There are no discontinued medications.     Follow Up Appointment:   4 weeks           This document has been electronically signed by Jose Alfredo Weaver MD  June 6, 2024 14:51 EDT

## 2024-06-18 ENCOUNTER — TELEMEDICINE (OUTPATIENT)
Dept: PSYCHIATRY | Facility: CLINIC | Age: 24
End: 2024-06-18
Payer: COMMERCIAL

## 2024-06-18 DIAGNOSIS — F43.12 CHRONIC POST-TRAUMATIC STRESS DISORDER (PTSD): ICD-10-CM

## 2024-06-18 DIAGNOSIS — F33.1 MODERATE EPISODE OF RECURRENT MAJOR DEPRESSIVE DISORDER: Primary | ICD-10-CM

## 2024-06-18 DIAGNOSIS — F41.1 GAD (GENERALIZED ANXIETY DISORDER): ICD-10-CM

## 2024-06-18 PROCEDURE — 90834 PSYTX W PT 45 MINUTES: CPT

## 2024-06-18 NOTE — PROGRESS NOTES
Date: 2024  Time In: 15:31 EDT  Time out: 4:10 PM EST    This provider is located at [facility], [facility] [place], [place], [facility], using a secure [employer] Video Visit through [employer]. Patient is being seen remotely via telehealth at their home address is located in [place]. Patient stated they are in a secure environment for this session. The patient's condition being diagnosed and treated is appropriate for telemedicine. The provider identified themself as well as their credentials. The patient, or  patient's legal guardian consent to be seen remotely, and when consent is given they understand that the consent allows for patient identifiable information to be sent to a third party as needed. They may refuse to be seen remotely at any time. The electronic data is encrypted and password protected, and the patient's or  legal guardian has been advised of the potential risks to privacy not withstanding such measures.   PT Identifiers used: Name and .    You have chosen to receive care through a telehealth visit.  Do you consent to use a video/audio connection for your medical care today? Yes    Subjective   Estela Santos is a 24 y.o. female who presents today for follow up    Chief Complaint:   Chief Complaint   Patient presents with    Anxiety    Depression    PTSD        Data: Pt reported having a pretty good two weeks. Pt reported some issues with mom since last session. Pt reported they are starting family therapy today. Pt reported not feeling supported by her sister emotionally. Pt reported starting to sew, practicing driving, being outside more to aid with MH. Pt reported some anxiety and fear of the future with employment, moving, ability to support self.       Clinical Maneuvering/Intervention: MI, validation, DBT techniques. Assisted patient in processing above session content; acknowledged and normalized patient’s thoughts, feelings, and concerns.  Rationalized  patient thought process regarding concerns presented at session.  Discussed triggers associated with patient's  anxiety , depression , and PTSD Also discussed coping skills for patient to implement such as grounding , mindfulness , self care , positive self talk , and hobbies, boundaries.opposite action.    Allowed patient to freely discuss issues without interruption or judgment. Provided safe, confidential environment to facilitate the development of positive therapeutic relationship and encourage open, honest communication. Assisted patient in identifying risk factors which would indicate the need for higher level of care including thoughts to harm self or others and/or self-harming behavior and encouraged patient to contact this office, call 911, or present to the nearest emergency room should any of these events occur. Discussed crisis intervention services and means to access. Patient adamantly and convincingly denies current suicidal or homicidal ideation or perceptual disturbance.    Assessment: Pt arrived early to session, she was alert and oriented. Pt appeared in good spirits today. Pt appears optimistic re family therapy with mom, hopefully will improve moms receptiveness to boundaries. Pt appears to personalize family's reactions at times. Pt appears to be proactive re MH with routine and coping skills. Pts anxiety and depression appears to be improving through self reports. Pt denies SI since last session. Pt appeared receptive to encouragements and techniques discussed in session.    Patient appears to maintain relative stability as compared to their baseline.  However, patient continues to struggle with   Chief Complaint   Patient presents with    Anxiety    Depression    PTSD    which continues to cause impairment in important areas of functioning.  A result, they can be reasonably expected to continue to benefit from treatment and would likely be at increased risk for decompensation  otherwise.        Mental Status Exam:   Hygiene:   good  Cooperation:  Cooperative  Eye Contact:  Good  Psychomotor Behavior:  Appropriate  Affect:  Appropriate  Mood: normal  Speech:  Normal  Thought Process:  Linear  Thought Content:  Normal  Suicidal:  None  Homicidal:  None  Hallucinations:  None  Delusion:  None  Memory:  Intact  Orientation:  Grossly intact  Reliability:  fair  Insight:  Fair  Judgement:  Fair  Impulse Control:  Fair  Physical/Medical Issues:  No        Patient's Support Network Includes:  father, partner    Functional Status: No impairment    Progress toward goal: Not at goal    Prognosis: Fair with Ongoing Treatment     Plan: FU on family therapy and effects on MH    Patient will continue in individual outpatient therapy with focus on improved functioning and coping skills, maintaining stability, and avoiding decompensation and the need for higher level of care.    Patient will adhere to medication regimen as prescribed and report any side effects. Patient will contact this office, call 911 or present to the nearest emergency room should suicidal or homicidal ideations occur. Provide Cognitive Behavioral Therapy and Solution Focused Therapy to improve functioning, maintain stability, and avoid decompensation and the need for higher level of care.     Return in about 1 week (around 6/25/2024).      VISIT DIAGNOSIS:    Diagnosis Plan   1. Moderate episode of recurrent major depressive disorder        2. AMELIA (generalized anxiety disorder)        3. Chronic post-traumatic stress disorder (PTSD)         15:31 EDT         This document has been electronically signed by Hortencia Manjarrez LCSW  June 18, 2024      Part of this note may be an electronic transcription/translation of spoken language to printed text using the Dragon Dictation System.

## 2024-06-24 ENCOUNTER — TELEMEDICINE (OUTPATIENT)
Dept: PSYCHIATRY | Facility: CLINIC | Age: 24
End: 2024-06-24
Payer: COMMERCIAL

## 2024-06-24 DIAGNOSIS — F43.12 CHRONIC POST-TRAUMATIC STRESS DISORDER (PTSD): ICD-10-CM

## 2024-06-24 DIAGNOSIS — F41.1 GAD (GENERALIZED ANXIETY DISORDER): ICD-10-CM

## 2024-06-24 DIAGNOSIS — F33.1 MODERATE EPISODE OF RECURRENT MAJOR DEPRESSIVE DISORDER: Primary | ICD-10-CM

## 2024-06-24 PROCEDURE — 90837 PSYTX W PT 60 MINUTES: CPT

## 2024-06-24 NOTE — PROGRESS NOTES
Date: 2024  Time In: 12:30 EDT  Time out: 1:25 PM EST    This provider is located at [facility], [facility] [place], [place], [facility], using a secure [employer] Video Visit through [employer]. Patient is being seen remotely via telehealth at their home address is located in [place]. Patient stated they are in a secure environment for this session. The patient's condition being diagnosed and treated is appropriate for telemedicine. The provider identified themself as well as their credentials. The patient, or  patient's legal guardian consent to be seen remotely, and when consent is given they understand that the consent allows for patient identifiable information to be sent to a third party as needed. They may refuse to be seen remotely at any time. The electronic data is encrypted and password protected, and the patient's or  legal guardian has been advised of the potential risks to privacy not withstanding such measures.   PT Identifiers used: Name and .    You have chosen to receive care through a telehealth visit.  Do you consent to use a video/audio connection for your medical care today? Yes    Subjective   Estela Santos is a 24 y.o. female who presents today for follow up    Chief Complaint:   Chief Complaint   Patient presents with    Depression    PTSD    Anxiety        Data: Pt reported family therapy went 'ok'. Pt reported feeling numb as she learned her cousin committed suicide last week. Pt reported spending time with family over the weekend which served as a distraction and closure. Pt reported spending time thinking about the last time she spent with her cousin. Pt reflected on her relationship with her cousin during session. Pt reported wishing they were able to help them but did not realize the severity of their MH symptoms. Pt reflected on other friends/family that have passed and her grief reaction.      Clinical Maneuvering/Intervention: Processing,  validation  Assisted patient in processing above session content; acknowledged and normalized patient’s thoughts, feelings, and concerns.  Rationalized patient thought process regarding concerns presented at session.  Discussed triggers associated with patient's  anxiety , depression , and PTSD Also discussed coping skills for patient to implement such as grounding , mindfulness , self care , positive self talk , and utilizing support    Allowed patient to freely discuss issues without interruption or judgment. Provided safe, confidential environment to facilitate the development of positive therapeutic relationship and encourage open, honest communication. Assisted patient in identifying risk factors which would indicate the need for higher level of care including thoughts to harm self or others and/or self-harming behavior and encouraged patient to contact this office, call 911, or present to the nearest emergency room should any of these events occur. Discussed crisis intervention services and means to access. Patient adamantly and convincingly denies current suicidal or homicidal ideation or perceptual disturbance.    Assessment: Pt arrived on time to session, she was alert and oriented  x3. Pt appears to be in shock still from news of cousins suicide last week. This death appears to have resurfaced other grief experiences. Pt appears to be processing this event/emotions and verbalizing feelings to supports. Pt denies thoughts of self harm or SI at this time. Pt appears to be hopeful of family therapy and improving the relationship with mother. Pts mental health has been negatively impacted due to this experience re cousin. Pt appeared receptive to coping skills and encouragements during this time.    Patient appears to maintain relative stability as compared to their baseline.  However, patient continues to struggle with   Chief Complaint   Patient presents with    Depression    PTSD    Anxiety    which  continues to cause impairment in important areas of functioning.  A result, they can be reasonably expected to continue to benefit from treatment and would likely be at increased risk for decompensation otherwise.        Mental Status Exam:   Hygiene:   good  Cooperation:  Cooperative  Eye Contact:  Good  Psychomotor Behavior:  Appropriate  Affect:  Appropriate  Mood: sad  Speech:  Normal  Thought Process:  Linear  Thought Content:  Normal  Suicidal:  None  Homicidal:  None  Hallucinations:  None  Delusion:  None  Memory:  Intact  Orientation:  Grossly intact  Reliability:  fair  Insight:  Fair  Judgement:  Fair  Impulse Control:  Fair  Physical/Medical Issues:  No        Patient's Support Network Includes:  significant other and father    Functional Status: No impairment    Progress toward goal: Not at goal    Prognosis: Fair with Ongoing Treatment     Plan: Processing grief reaction.    Patient will continue in individual outpatient therapy with focus on improved functioning and coping skills, maintaining stability, and avoiding decompensation and the need for higher level of care.    Patient will adhere to medication regimen as prescribed and report any side effects. Patient will contact this office, call 911 or present to the nearest emergency room should suicidal or homicidal ideations occur. Provide Cognitive Behavioral Therapy and Solution Focused Therapy to improve functioning, maintain stability, and avoid decompensation and the need for higher level of care.     Return in about 2 weeks (around 7/8/2024).      VISIT DIAGNOSIS:    Diagnosis Plan   1. Moderate episode of recurrent major depressive disorder        2. AMELIA (generalized anxiety disorder)        3. Chronic post-traumatic stress disorder (PTSD)         12:30 EDT         This document has been electronically signed by Hortencia Manjarrez LCSW  June 24, 2024      Part of this note may be an electronic transcription/translation of spoken language to  printed text using the Dragon Dictation System.

## 2024-07-02 ENCOUNTER — TELEMEDICINE (OUTPATIENT)
Dept: PSYCHIATRY | Facility: CLINIC | Age: 24
End: 2024-07-02
Payer: COMMERCIAL

## 2024-07-02 DIAGNOSIS — F43.12 CHRONIC POST-TRAUMATIC STRESS DISORDER (PTSD): ICD-10-CM

## 2024-07-02 DIAGNOSIS — F41.1 GAD (GENERALIZED ANXIETY DISORDER): ICD-10-CM

## 2024-07-02 DIAGNOSIS — F33.1 MODERATE EPISODE OF RECURRENT MAJOR DEPRESSIVE DISORDER: Primary | ICD-10-CM

## 2024-07-02 PROCEDURE — 99214 OFFICE O/P EST MOD 30 MIN: CPT | Performed by: STUDENT IN AN ORGANIZED HEALTH CARE EDUCATION/TRAINING PROGRAM

## 2024-07-02 NOTE — PROGRESS NOTES
This provider is located at the [facility] (through [facility]), [address], [place] [address], using a secure [employer] Video Visit through [employer]. Patient is being seen remotely via telehealth at their home address in [place], and stated they are in a secure environment for this session. The patient's condition being diagnosed/treated is appropriate for telemedicine. The provider identified herself as well as her credentials.  The patient, and/or patients guardian, consent to be seen remotely, and when consent is given they understand that the consent allows for patient identifiable information to be sent to a third party as needed.   They may refuse to be seen remotely at any time. The electronic data is encrypted and password protected, and the patient and/or guardian has been advised of the potential risks to privacy not withstanding such measures.    You have chosen to receive care through a telehealth visit.  Do you consent to use a video/audio connection for your medical care today? Yes    Patient identifiers utilized: Name and date of birth.    Patient verbally confirmed consent for today's encounter:  July 2, 2024  Subjective     Estela Santos is a 24 y.o. female who presents today for follow up    Chief Complaint:    Chief Complaint   Patient presents with    Depression    Anxiety        History of Present Illness:    - Estela Santos is a 24 y.o. patient presenting for follow up of depression/anxiety. At the previous visit, no changes were made other than planning decrease hydroxyzine.   - Today, patient is doing alright. She did have a cousin commit suicide which has been difficult for her family. Her mother and sisters where the only folks who were close with this cousin due to challenging family dynamics and that has made it even worse.  - She states that mood is 'a little worse as you would expect'. She is not sure how much is medicine not helping vs  "just normal reaction to this kind of stressor        Current Medications:  Wellbutrin  mg qday  Zoloft 150 mg qday    Side Effects: Denies  Sleep: Improved since decreasing Hydroxyzine back down to 10 mg  Mood: \"Sad\"  SI/HI/AVH: Denies  Overall Function: Stable      The following portions of the patient's history were reviewed and updated as appropriate: allergies, current medications, past family history, past medical history, past social history, past surgical history and problem list.        Past Medical History:  Past Medical History:   Diagnosis Date    Anxiety 2016    Depression 2015    Hypertension 2016       Substance Abuse History:   Types:Denies all, including illicit  Withdrawal Symptoms:Denies  Longest Period Sober:Not Applicable   Interest In Treatment: N/A      Social History:  Social History     Socioeconomic History    Marital status: Single   Tobacco Use    Smoking status: Never     Passive exposure: Never    Smokeless tobacco: Never   Substance and Sexual Activity    Alcohol use: Never    Drug use: Never    Sexual activity: Yes     Partners: Male     Birth control/protection: Condom, Natural family planning/Rhythm       Family History:  Family History   Problem Relation Age of Onset    Depression Mother     Mental illness Mother     Thyroid disease Mother         Has Hashimoto    Hyperlipidemia Father     Thyroid disease Maternal Grandmother         Has MS and other autoimmune diseases    Diabetes Paternal Grandmother     Hyperlipidemia Paternal Grandmother     Alcohol abuse Maternal Uncle         Passed away due to heart attack from alcoholism    Alcohol abuse Maternal Uncle         Liver failure due to alcoholism    Thyroid disease Maternal Aunt         Has ms and other autoimmune diseases    Thyroid disease Maternal Aunt        Past Surgical History:  History reviewed. No pertinent surgical history.    Problem List:  Patient Active Problem List   Diagnosis    Essential hypertension    AMELIA " (generalized anxiety disorder)    Moderate episode of recurrent major depressive disorder       Allergy:   No Known Allergies     Current Medications:   Current Outpatient Medications   Medication Sig Dispense Refill    buPROPion XL (Wellbutrin XL) 150 MG 24 hr tablet Take 1 tablet by mouth Every Morning for 90 days. 30 tablet 2    hydrOXYzine (ATARAX) 10 MG tablet Take 1-2 tablets by mouth At Night As Needed (anxiety and/or sleep). 30 tablet 2    NIFEdipine XL (PROCARDIA XL) 60 MG 24 hr tablet TAKE 1 TABLET BY MOUTH DAILY 90 tablet 0    sertraline (Zoloft) 100 MG tablet Take 1.5 tablets by mouth Daily. 30 tablet 0     No current facility-administered medications for this visit.       Review of Symptoms:    Review of Systems   Psychiatric/Behavioral:  Positive for depressed mood and stress. Negative for sleep disturbance and suicidal ideas.        Physical Exam:   Physical Exam  Constitutional:       Appearance: Normal appearance. She is not toxic-appearing.   Neurological:      Mental Status: She is alert.   Psychiatric:         Mood and Affect: Mood normal.         Behavior: Behavior normal.         Thought Content: Thought content normal.         Judgment: Judgment normal.         Vitals:  There were no vitals taken for this visit.   There is no height or weight on file to calculate BMI.    Last 3 Blood Pressure Readings:  BP Readings from Last 3 Encounters:   11/16/23 130/82   09/29/23 150/68   07/10/23 160/80       PHQ-9 Score:   PHQ-9 Total Score: (P) 9     AMELIA-7 Score:   Feeling nervous, anxious or on edge: (P) Several days  Not being able to stop or control worrying: (P) More than half the days  Worrying too much about different things: (P) Several days  Trouble Relaxing: (P) Not at all  Being so restless that it is hard to sit still: (P) Not at all  Feeling afraid as if something awful might happen: (P) Several days  Becoming easily annoyed or irritable: (P) Several days  AMELIA 7 Total Score: (P) 6  If you  checked any problems, how difficult have these problems made it for you to do your work, take care of things at home, or get along with other people: (P) Somewhat difficult     Mental Status Exam:   Hygiene:   good  Cooperation:  Cooperative  Eye Contact:  Good  Psychomotor Behavior:  Appropriate  Affect:  Full range  and Appropriate   Mood: sad  Hopelessness: Denies  Speech:  Normal  Thought Process:  Goal directed and Linear  Thought Content:  Normal  Suicidal:  None  Homicidal:  None  Hallucinations:  None  Delusion:  None  Memory:  Intact  Orientation:  Grossly intact  Reliability:  good  Insight:  Fair  Judgement:  Fair  Impulse Control:  Fair  Physical/Medical Issues:  Denies       Lab Results:   No visits with results within 3 Month(s) from this visit.   Latest known visit with results is:   Office Visit on 12/12/2022   Component Date Value Ref Range Status    WBC 12/29/2022 8.40  3.40 - 10.80 10*3/mm3 Final    RBC 12/29/2022 5.20  3.77 - 5.28 10*6/mm3 Final    Hemoglobin 12/29/2022 13.3  12.0 - 15.9 g/dL Final    Hematocrit 12/29/2022 41.7  34.0 - 46.6 % Final    MCV 12/29/2022 80.2  79.0 - 97.0 fL Final    MCH 12/29/2022 25.6 (L)  26.6 - 33.0 pg Final    MCHC 12/29/2022 31.9  31.5 - 35.7 g/dL Final    RDW 12/29/2022 14.9  12.3 - 15.4 % Final    Platelets 12/29/2022 347  140 - 450 10*3/mm3 Final    Glucose 12/29/2022 100 (H)  65 - 99 mg/dL Final    BUN 12/29/2022 7  6 - 20 mg/dL Final    Creatinine 12/29/2022 0.62  0.57 - 1.00 mg/dL Final    EGFR Result 12/29/2022 130.9  >60.0 mL/min/1.73 Final    Comment: National Kidney Foundation and American Society of  Nephrology (ASN) Task Force recommended calculation based  on the Chronic Kidney Disease Epidemiology Collaboration  (CKD-EPI) equation refit without adjustment for race.  GFR Normal >60  Chronic Kidney Disease <60  Kidney Failure <15      BUN/Creatinine Ratio 12/29/2022 11.3  7.0 - 25.0 Final    Sodium 12/29/2022 138  136 - 145 mmol/L Final    Potassium  12/29/2022 4.1  3.5 - 5.2 mmol/L Final    Chloride 12/29/2022 106  98 - 107 mmol/L Final    Total CO2 12/29/2022 24.2  22.0 - 29.0 mmol/L Final    Calcium 12/29/2022 9.1  8.6 - 10.5 mg/dL Final    Total Protein 12/29/2022 7.2  6.0 - 8.5 g/dL Final    Albumin 12/29/2022 4.3  3.5 - 5.2 g/dL Final    Globulin 12/29/2022 2.9  gm/dL Final    A/G Ratio 12/29/2022 1.5  g/dL Final    Total Bilirubin 12/29/2022 0.3  0.0 - 1.2 mg/dL Final    Alkaline Phosphatase 12/29/2022 83  39 - 117 U/L Final    AST (SGOT) 12/29/2022 24  1 - 32 U/L Final    ALT (SGPT) 12/29/2022 33  1 - 33 U/L Final    Hemoglobin A1C 12/29/2022 5.40  4.80 - 5.60 % Final    Comment: Hemoglobin A1C Ranges:  Increased Risk for Diabetes  5.7% to 6.4%  Diabetes                     >= 6.5%  Diabetic Goal                < 7.0%      Hep C Virus Ab 12/29/2022 0.2  0.0 - 0.9 s/co ratio Final    Comment:                                   Negative:     < 0.8                               Indeterminate: 0.8 - 0.9                                    Positive:     > 0.9   HCV antibody alone does not differentiate between   previous resolved infection and active infection.   The CDC and current clinical guidelines recommend   that a positive HCV antibody result be followed up   with an HCV RNA test to support the diagnosis of   acute HCV infection. [facility] offers Hepatitis C   Virus (HCV) RNA, Diagnosis, GENEVIEVE (485362) and   Hepatitis C Virus (HCV) Antibody with reflex to   Quantitative Real-time PCR (574265).      Total Cholesterol 12/29/2022 145  0 - 200 mg/dL Final    Comment: Cholesterol Reference Ranges  (U.S. Department of Health and Human Services ATP III  Classifications)  Desirable          <200 mg/dL  Borderline High    200-239 mg/dL  High Risk          >240 mg/dL  Triglyceride Reference Ranges  (U.S. Department of Health and Human Services ATP III  Classifications)  Normal           <150 mg/dL  Borderline High  150-199 mg/dL  High             200-499 mg/dL  Very  High        >500 mg/dL  HDL Reference Ranges  (U.S. Department of Health and Human Services ATP III  Classifications)  Low     <40 mg/dl (major risk factor for CHD)  High    >60 mg/dl ('negative' risk factor for CHD)  LDL Reference Ranges  (U.S. Department of Health and Human Services ATP III  Classifications)  Optimal          <100 mg/dL  Near Optimal     100-129 mg/dL  Borderline High  130-159 mg/dL  High             160-189 mg/dL  Very High        >189 mg/dL      Triglycerides 12/29/2022 115  0 - 150 mg/dL Final    HDL Cholesterol 12/29/2022 29 (L)  40 - 60 mg/dL Final    VLDL Cholesterol Ray 12/29/2022 21  5 - 40 mg/dL Final    LDL Chol Calc (NIH) 12/29/2022 95  0 - 100 mg/dL Final    Chol/HDL Ratio 12/29/2022 5.00   Final    TSH 12/29/2022 2.940  0.270 - 4.200 uIU/mL Final    Free T4 12/29/2022 1.10  0.93 - 1.70 ng/dL Final    Results may be falsely increased if patient taking Biotin.    Thyroid Peroxidase Antibody 12/29/2022 9  0 - 34 IU/mL Final    Thyroglobulin Ab 12/29/2022 <1.0  0.0 - 0.9 IU/mL Final    Thyroglobulin Antibody measured by [employer]    Vitamin B-12 12/29/2022 272  211 - 946 pg/mL Final    Results may be falsely increased if patient taking Biotin.    25 Hydroxy, Vitamin D 12/29/2022 12.9 (L)  30.0 - 100.0 ng/ml Final    Comment: Results may be falsely increased if patient taking Biotin.  Reference Range for Total Vitamin D 25(OH)  Deficiency <20.0 ng/mL  Insufficiency 21-29 ng/mL  Sufficiency  ng/mL  Toxicity >100 ng/ml           Assessment & Plan   Diagnoses and all orders for this visit:    1. Moderate episode of recurrent major depressive disorder (Primary)    2. AMELIA (generalized anxiety disorder)    3. Chronic post-traumatic stress disorder (PTSD)        Visit Diagnoses:    ICD-10-CM ICD-9-CM   1. Moderate episode of recurrent major depressive disorder  F33.1 296.32   2. AMELIA (generalized anxiety disorder)  F41.1 300.02   3. Chronic post-traumatic stress disorder  (PTSD)  F43.12 309.81       Formulation:  22 yo with major trauma history presenting for follow up evaluation and management of anxiety/irritability. Primary concerns are short fuse/irritability, and struggles with motivation/distrust of individuals. Trauma history suggest Complex PTSD as the primary etiology, with patient also meeting criteria for AMELIA and MDD.      Today patient is stable but having a tough time. Recently had a cousin pass away from suicide which has been really difficult. Patient is meeting more frequently with Hortencia which has been helpful. Its difficult to assess the degree of patient's symptom burden in the context of recent stressors. After discussion, will plan to keep current course then reassess in 3 weeks.      Plan:  #Complex PTSD  #AMELIA  #MDD, moderate, recurrent  - Continue Zoloft to 150 mg qday  - Continue  Wellbutrin to 150 mg qday for depression/motivation/energy  - Continue OTC Melatonin  - Continue Hydroxyzine 10 mg PRN anxiety/insomnia  - Continue therapy as scheduled     Risk Assessment for Suicide/Harm To Self/Others: : Based on patient history, demographics and today's interview, Patient is considered to be at low acute risk for self harm/harm to others, though baseline risk is increased compared to general population due to significant psychiatric comorbidity. Protective factors include denial of current SI, future orientation and engagement in treatment.      GOALS:  Short Term Goals: Patient will be compliant with medication, and patient will have no significant medication related side effects.  Patient will be engaged in psychotherapy as indicated.  Patient will report subjective improvement of symptoms.  Long term goals: To stabilize mood and treat/improve subjective symptoms, the patient will stay out of the hospital, the patient will be at an optimal level of functioning, and the patient will take all medications as prescribed.  The patient/guardian verbalized  understanding and agreement with goals that were mutually set.      TREATMENT PLAN: Continue supportive psychotherapy efforts and medications as indicated.  Pharmacological and Non-Pharmacological treatment options discussed during today's visit. Patient/Guardian acknowledged and verbally consented with current treatment plan and was educated on the importance of compliance with treatment and follow-up appointments.      MEDICATION ISSUES:  Discussed medication options and treatment plan of prescribed medication as well as the risks, benefits, any black box warnings, and side effects including potential falls, possible impaired driving, and metabolic adversities among others. Patient is agreeable to call the office with any worsening of symptoms or onset of side effects, or if any concerns or questions arise.  The contact information for the office is made available to the patient. Patient is agreeable to call 911 or go to the nearest ER should they begin having any SI/HI, or if any urgent concerns arise. No medication side effects or related complaints today.     MEDS ORDERED DURING VISIT:  No orders of the defined types were placed in this encounter.      MEDS DISCONTINUED DURING VISIT:   There are no discontinued medications.     Follow Up Appointment:   3 weeks           This document has been electronically signed by Jose Alfredo Weaver MD  July 2, 2024 14:47 EDT

## 2024-07-05 DIAGNOSIS — I10 ESSENTIAL HYPERTENSION: ICD-10-CM

## 2024-07-05 RX ORDER — NIFEDIPINE 60 MG/1
60 TABLET, EXTENDED RELEASE ORAL DAILY
Qty: 14 TABLET | Refills: 0 | Status: SHIPPED | OUTPATIENT
Start: 2024-07-05

## 2024-07-05 NOTE — TELEPHONE ENCOUNTER
Name: Estela Santos    Relationship: Self    Best Callback Number: [phone]    HUB PROVIDED THE RELAY MESSAGE FROM THE OFFICE   PATIENT VOICED UNDERSTANDING AND HAS NO FURTHER QUESTIONS AT THIS TIME    ADDITIONAL INFORMATION: STATED THAT THEY HAVE AN APPOINTMENT WITH A PROVIDER IN [place] ON 7/19/24 AND THEY WERE JUST NEEDING TO SEE ABOUT GETTING ABOUT A 7 DAY SUPPLY OR SO UNTIL THEY GO TO THAT APPOINTMENT. PLEASE CALL AND ADVISE

## 2024-07-05 NOTE — TELEPHONE ENCOUNTER
Ok for HUB to read    Tried to call patient, no answer. Left a message for patient to call back to schedule and appointment for further refills. I am unsure if Crystal is still this patients PCP.

## 2024-07-09 ENCOUNTER — TELEMEDICINE (OUTPATIENT)
Dept: PSYCHIATRY | Facility: CLINIC | Age: 24
End: 2024-07-09
Payer: COMMERCIAL

## 2024-07-09 DIAGNOSIS — F41.1 GAD (GENERALIZED ANXIETY DISORDER): ICD-10-CM

## 2024-07-09 DIAGNOSIS — F33.1 MODERATE EPISODE OF RECURRENT MAJOR DEPRESSIVE DISORDER: Primary | ICD-10-CM

## 2024-07-09 DIAGNOSIS — F43.12 CHRONIC POST-TRAUMATIC STRESS DISORDER (PTSD): ICD-10-CM

## 2024-07-09 PROCEDURE — 90832 PSYTX W PT 30 MINUTES: CPT

## 2024-07-09 NOTE — PROGRESS NOTES
Date: 2024  Time In: 16:59 EDT  Time out: 5:29 PM EST    This provider is located at [facility], [facility] [address] [place], [place], [facility], using a secure [employer] Video Visit through [employer]. Patient is being seen remotely via telehealth at their home address is located in [place]. Patient stated they are in a secure environment for this session. The patient's condition being diagnosed and treated is appropriate for telemedicine. The provider identified themself as well as their credentials. The patient, or  patient's legal guardian consent to be seen remotely, and when consent is given they understand that the consent allows for patient identifiable information to be sent to a third party as needed. They may refuse to be seen remotely at any time. The electronic data is encrypted and password protected, and the patient's or  legal guardian has been advised of the potential risks to privacy not withstanding such measures.   PT Identifiers used: Name and .    You have chosen to receive care through a telehealth visit.  Do you consent to use a video/audio connection for your medical care today? Yes    Subjective   Estela Santos is a 24 y.o. female who presents today for follow up    Chief Complaint:   Chief Complaint   Patient presents with    Anxiety    Depression    PTSD        Data: Pt reported that continued conflict with mom, she has family therapy with her today. Pt reported losing hope in family therapy because she doesn't think mom is receptive. Pt reported that her mother has not been receptive to topics discussed in session or communication skills. Pt reported she has started to remove herself from situations (to process) that effect her negatively instead of reacting. Pt reported continuing to grieve her cousin who recently passed away.    Session time was exclusive to therapy and separate from time spent on activities outside of session to aid with MH  symptoms.    Clinical Maneuvering/Intervention: Processing, validation, strength based approach. Assisted patient in processing above session content; acknowledged and normalized patient’s thoughts, feelings, and concerns.  Rationalized patient thought process regarding concerns presented at session.  Discussed triggers associated with patient's  anxiety , depression , and PTSD Also discussed coping skills for patient to implement such as grounding , mindfulness , self care , and writing/journaling    Allowed patient to freely discuss issues without interruption or judgment. Provided safe, confidential environment to facilitate the development of positive therapeutic relationship and encourage open, honest communication. Assisted patient in identifying risk factors which would indicate the need for higher level of care including thoughts to harm self or others and/or self-harming behavior and encouraged patient to contact this office, call 911, or present to the nearest emergency room should any of these events occur. Discussed crisis intervention services and means to access. Patient adamantly and convincingly denies current suicidal or homicidal ideation or perceptual disturbance.    Assessment: Pt arrived on time to session, she was alert and oriented. Pt appears to be improving with responses and not reacting out of anger. Pts mother appears to be a trigger, pt is being proactive in improving this relationship through family therapy. Pt has experienced trauma throughout childhood, mothers actions contributed. Pt appears to want a better relationship with mom despite the past. Pts anxiety appears increased this week due to conflict with mom. Pt appears to be implementing coping skills. Pt appears to be having a normal grief reaction.    Patient appears to maintain relative stability as compared to their baseline.  However, patient continues to struggle with   Chief Complaint   Patient presents with    Anxiety     Depression    PTSD    which continues to cause impairment in important areas of functioning.  A result, they can be reasonably expected to continue to benefit from treatment and would likely be at increased risk for decompensation otherwise.      Mental Status Exam:   Hygiene:   good  Cooperation:  Cooperative  Eye Contact:  Good  Psychomotor Behavior:  Appropriate  Affect:  Full range  Mood: normal  Speech:  Normal  Thought Process:  Linear  Thought Content:  Normal  Suicidal:  None  Homicidal:  None  Hallucinations:  None  Delusion:  None  Memory:  Intact  Orientation:  Grossly intact  Reliability:  fair  Insight:  Fair  Judgement:  Fair  Impulse Control:  Fair  Physical/Medical Issues:  No        Patient's Support Network Includes:  significant other    Functional Status: No impairment    Progress toward goal: Not at goal    Prognosis: Fair with Ongoing Treatment         Plan:     Patient will continue in individual outpatient therapy with focus on improved functioning and coping skills, maintaining stability, and avoiding decompensation and the need for higher level of care.    Patient will adhere to medication regimen as prescribed and report any side effects. Patient will contact this office, call 911 or present to the nearest emergency room should suicidal or homicidal ideations occur. Provide Cognitive Behavioral Therapy and Solution Focused Therapy to improve functioning, maintain stability, and avoid decompensation and the need for higher level of care.     Return in about 3 weeks (around 7/30/2024).      VISIT DIAGNOSIS:    Diagnosis Plan   1. Moderate episode of recurrent major depressive disorder        2. AMELIA (generalized anxiety disorder)        3. Chronic post-traumatic stress disorder (PTSD)         16:59 EDT         This document has been electronically signed by Hortencia Manjarrez LCSW  July 9, 2024      Part of this note may be an electronic transcription/translation of spoken language to  printed text using the Dragon Dictation System.

## 2024-07-13 DIAGNOSIS — F41.1 GAD (GENERALIZED ANXIETY DISORDER): ICD-10-CM

## 2024-07-13 DIAGNOSIS — F33.1 MODERATE EPISODE OF RECURRENT MAJOR DEPRESSIVE DISORDER: ICD-10-CM

## 2024-07-15 RX ORDER — SERTRALINE HYDROCHLORIDE 100 MG/1
150 TABLET, FILM COATED ORAL DAILY
Qty: 30 TABLET | Refills: 0 | Status: SHIPPED | OUTPATIENT
Start: 2024-07-15

## 2024-07-18 DIAGNOSIS — I10 ESSENTIAL HYPERTENSION: ICD-10-CM

## 2024-07-18 RX ORDER — NIFEDIPINE 60 MG/1
60 TABLET, EXTENDED RELEASE ORAL DAILY
Qty: 14 TABLET | Refills: 0 | OUTPATIENT
Start: 2024-07-18

## 2024-07-25 ENCOUNTER — TELEMEDICINE (OUTPATIENT)
Dept: PSYCHIATRY | Facility: CLINIC | Age: 24
End: 2024-07-25
Payer: COMMERCIAL

## 2024-07-25 DIAGNOSIS — F41.1 GAD (GENERALIZED ANXIETY DISORDER): ICD-10-CM

## 2024-07-25 DIAGNOSIS — F33.1 MODERATE EPISODE OF RECURRENT MAJOR DEPRESSIVE DISORDER: Primary | ICD-10-CM

## 2024-07-25 DIAGNOSIS — F43.12 CHRONIC POST-TRAUMATIC STRESS DISORDER (PTSD): ICD-10-CM

## 2024-07-25 RX ORDER — PRAZOSIN HYDROCHLORIDE 1 MG/1
1 CAPSULE ORAL NIGHTLY
Qty: 30 CAPSULE | Refills: 0 | Status: SHIPPED | OUTPATIENT
Start: 2024-07-25

## 2024-07-25 RX ORDER — BUPROPION HYDROCHLORIDE 150 MG/1
150 TABLET ORAL EVERY MORNING
Qty: 30 TABLET | Refills: 2 | Status: SHIPPED | OUTPATIENT
Start: 2024-07-25 | End: 2024-10-23

## 2024-07-25 RX ORDER — BUPROPION HYDROCHLORIDE 150 MG/1
150 TABLET ORAL EVERY MORNING
Qty: 30 TABLET | Refills: 2 | OUTPATIENT
Start: 2024-07-25

## 2024-07-25 NOTE — PROGRESS NOTES
This provider is located at the [facility] (through [facility]), [address], [place] [address], using a secure [employer] Video Visit through [employer]. Patient is being seen remotely via telehealth at their home address in [place], and stated they are in a secure environment for this session. The patient's condition being diagnosed/treated is appropriate for telemedicine. The provider identified herself as well as her credentials.  The patient, and/or patients guardian, consent to be seen remotely, and when consent is given they understand that the consent allows for patient identifiable information to be sent to a third party as needed.   They may refuse to be seen remotely at any time. The electronic data is encrypted and password protected, and the patient and/or guardian has been advised of the potential risks to privacy not withstanding such measures.    You have chosen to receive care through a telehealth visit.  Do you consent to use a video/audio connection for your medical care today? Yes    Patient identifiers utilized: Name and date of birth.    Patient verbally confirmed consent for today's encounter:  July 25, 2024  Subjective     Estela Santos is a 24 y.o. female who presents today for follow up    Chief Complaint:    Chief Complaint   Patient presents with    Depression        History of Present Illness:    - Estela Santos is a 24 y.o. patient presenting for follow up of depression. At the previous visit, no changes were made, patient was under a lot of stress due to a suicide in the family, and it was decided it was best to wait before making medication changes  - Today, patient is okay. She says that she has some stress relating to her living situation  - She says that her mood is 'okay'. She says she is not super happy, but she feels like she is still taking care of her basic needs  - Is dealing with more nightmares recently. Feels like some are  "flashbacks related to her cousin, others are just 'very violent and scary       Current Medications:  Zoloft 150 mg qday  Wellbutrin  mg qday    Side Effects: Denies  Sleep: No major changes  Mood: \"Fine\"  SI/HI/AVH: Denies  Overall Function: Improved      The following portions of the patient's history were reviewed and updated as appropriate: allergies, current medications, past family history, past medical history, past social history, past surgical history and problem list.        Past Medical History:  Past Medical History:   Diagnosis Date    Anxiety 2016    Depression 2015    Hypertension 2016       Substance Abuse History:   Types:Denies all, including illicit  Withdrawal Symptoms:Denies  Longest Period Sober:Not Applicable   Interest In Treatment: N/A      Social History:  Social History     Socioeconomic History    Marital status: Single   Tobacco Use    Smoking status: Never     Passive exposure: Never    Smokeless tobacco: Never   Substance and Sexual Activity    Alcohol use: Never    Drug use: Never    Sexual activity: Yes     Partners: Male     Birth control/protection: Condom, Natural family planning/Rhythm       Family History:  Family History   Problem Relation Age of Onset    Depression Mother     Mental illness Mother     Thyroid disease Mother         Has Hashimoto    Hyperlipidemia Father     Thyroid disease Maternal Grandmother         Has MS and other autoimmune diseases    Diabetes Paternal Grandmother     Hyperlipidemia Paternal Grandmother     Alcohol abuse Maternal Uncle         Passed away due to heart attack from alcoholism    Alcohol abuse Maternal Uncle         Liver failure due to alcoholism    Thyroid disease Maternal Aunt         Has ms and other autoimmune diseases    Thyroid disease Maternal Aunt        Past Surgical History:  History reviewed. No pertinent surgical history.    Problem List:  Patient Active Problem List   Diagnosis    Essential hypertension    AMELIA " (generalized anxiety disorder)    Moderate episode of recurrent major depressive disorder       Allergy:   No Known Allergies     Current Medications:   Current Outpatient Medications   Medication Sig Dispense Refill    buPROPion XL (Wellbutrin XL) 150 MG 24 hr tablet Take 1 tablet by mouth Every Morning for 90 days. 30 tablet 2    hydrOXYzine (ATARAX) 10 MG tablet Take 1-2 tablets by mouth At Night As Needed (anxiety and/or sleep). 30 tablet 2    NIFEdipine XL (PROCARDIA XL) 60 MG 24 hr tablet TAKE 1 TABLET BY MOUTH DAILY 14 tablet 0    sertraline (Zoloft) 100 MG tablet Take 1.5 tablets by mouth Daily. 30 tablet 0     No current facility-administered medications for this visit.       Review of Symptoms:    Review of Systems   Psychiatric/Behavioral:  Negative for suicidal ideas.    All other systems reviewed and are negative.      Physical Exam:   Physical Exam  Constitutional:       Appearance: Normal appearance. She is not toxic-appearing.   Neurological:      Mental Status: She is alert.   Psychiatric:         Mood and Affect: Mood normal.         Behavior: Behavior normal.         Thought Content: Thought content normal.         Judgment: Judgment normal.         Vitals:  There were no vitals taken for this visit.   There is no height or weight on file to calculate BMI.    Last 3 Blood Pressure Readings:  BP Readings from Last 3 Encounters:   11/16/23 130/82   09/29/23 150/68   07/10/23 160/80       PHQ-9 Score:   PHQ-9 Total Score: (P) 12     AMELIA-7 Score:   Feeling nervous, anxious or on edge: (P) More than half the days  Not being able to stop or control worrying: (P) Nearly every day  Worrying too much about different things: (P) Several days  Trouble Relaxing: (P) Several days  Being so restless that it is hard to sit still: (P) Several days  Feeling afraid as if something awful might happen: (P) Several days  Becoming easily annoyed or irritable: (P) More than half the days  AMELIA 7 Total Score: (P) 11  If  you checked any problems, how difficult have these problems made it for you to do your work, take care of things at home, or get along with other people: (P) Somewhat difficult     Mental Status Exam:   Hygiene:   good  Cooperation:  Cooperative  Eye Contact:  Good  Psychomotor Behavior:  Appropriate  Affect:  Full range  and Appropriate   Mood: euthymic  Hopelessness: Denies  Speech:  Normal  Thought Process:  Goal directed and Linear  Thought Content:  Normal  Suicidal:  None  Homicidal:  None  Hallucinations:  None  Delusion:  None  Memory:  Intact  Orientation:  Grossly intact  Reliability:  good  Insight:  Fair  Judgement:  Fair  Impulse Control:  Fair  Physical/Medical Issues:  Denies       Lab Results:   No visits with results within 3 Month(s) from this visit.   Latest known visit with results is:   Office Visit on 12/12/2022   Component Date Value Ref Range Status    WBC 12/29/2022 8.40  3.40 - 10.80 10*3/mm3 Final    RBC 12/29/2022 5.20  3.77 - 5.28 10*6/mm3 Final    Hemoglobin 12/29/2022 13.3  12.0 - 15.9 g/dL Final    Hematocrit 12/29/2022 41.7  34.0 - 46.6 % Final    MCV 12/29/2022 80.2  79.0 - 97.0 fL Final    MCH 12/29/2022 25.6 (L)  26.6 - 33.0 pg Final    MCHC 12/29/2022 31.9  31.5 - 35.7 g/dL Final    RDW 12/29/2022 14.9  12.3 - 15.4 % Final    Platelets 12/29/2022 347  140 - 450 10*3/mm3 Final    Glucose 12/29/2022 100 (H)  65 - 99 mg/dL Final    BUN 12/29/2022 7  6 - 20 mg/dL Final    Creatinine 12/29/2022 0.62  0.57 - 1.00 mg/dL Final    EGFR Result 12/29/2022 130.9  >60.0 mL/min/1.73 Final    Comment: National Kidney Foundation and American Society of  Nephrology (ASN) Task Force recommended calculation based  on the Chronic Kidney Disease Epidemiology Collaboration  (CKD-EPI) equation refit without adjustment for race.  GFR Normal >60  Chronic Kidney Disease <60  Kidney Failure <15      BUN/Creatinine Ratio 12/29/2022 11.3  7.0 - 25.0 Final    Sodium 12/29/2022 138  136 - 145 mmol/L Final     Potassium 12/29/2022 4.1  3.5 - 5.2 mmol/L Final    Chloride 12/29/2022 106  98 - 107 mmol/L Final    Total CO2 12/29/2022 24.2  22.0 - 29.0 mmol/L Final    Calcium 12/29/2022 9.1  8.6 - 10.5 mg/dL Final    Total Protein 12/29/2022 7.2  6.0 - 8.5 g/dL Final    Albumin 12/29/2022 4.3  3.5 - 5.2 g/dL Final    Globulin 12/29/2022 2.9  gm/dL Final    A/G Ratio 12/29/2022 1.5  g/dL Final    Total Bilirubin 12/29/2022 0.3  0.0 - 1.2 mg/dL Final    Alkaline Phosphatase 12/29/2022 83  39 - 117 U/L Final    AST (SGOT) 12/29/2022 24  1 - 32 U/L Final    ALT (SGPT) 12/29/2022 33  1 - 33 U/L Final    Hemoglobin A1C 12/29/2022 5.40  4.80 - 5.60 % Final    Comment: Hemoglobin A1C Ranges:  Increased Risk for Diabetes  5.7% to 6.4%  Diabetes                     >= 6.5%  Diabetic Goal                < 7.0%      Hep C Virus Ab 12/29/2022 0.2  0.0 - 0.9 s/co ratio Final    Comment:                                   Negative:     < 0.8                               Indeterminate: 0.8 - 0.9                                    Positive:     > 0.9   HCV antibody alone does not differentiate between   previous resolved infection and active infection.   The CDC and current clinical guidelines recommend   that a positive HCV antibody result be followed up   with an HCV RNA test to support the diagnosis of   acute HCV infection. [facility] offers Hepatitis C   Virus (HCV) RNA, Diagnosis, GENEVIEVE (156417) and   Hepatitis C Virus (HCV) Antibody with reflex to   Quantitative Real-time PCR (624641).      Total Cholesterol 12/29/2022 145  0 - 200 mg/dL Final    Comment: Cholesterol Reference Ranges  (U.S. Department of Health and Human Services ATP III  Classifications)  Desirable          <200 mg/dL  Borderline High    200-239 mg/dL  High Risk          >240 mg/dL  Triglyceride Reference Ranges  (U.S. Department of Health and Human Services ATP III  Classifications)  Normal           <150 mg/dL  Borderline High  150-199 mg/dL  High             200-499  mg/dL  Very High        >500 mg/dL  HDL Reference Ranges  (U.S. Department of Health and Human Services ATP III  Classifications)  Low     <40 mg/dl (major risk factor for CHD)  High    >60 mg/dl ('negative' risk factor for CHD)  LDL Reference Ranges  (U.S. Department of Health and Human Services ATP III  Classifications)  Optimal          <100 mg/dL  Near Optimal     100-129 mg/dL  Borderline High  130-159 mg/dL  High             160-189 mg/dL  Very High        >189 mg/dL      Triglycerides 12/29/2022 115  0 - 150 mg/dL Final    HDL Cholesterol 12/29/2022 29 (L)  40 - 60 mg/dL Final    VLDL Cholesterol Ray 12/29/2022 21  5 - 40 mg/dL Final    LDL Chol Calc (NIH) 12/29/2022 95  0 - 100 mg/dL Final    Chol/HDL Ratio 12/29/2022 5.00   Final    TSH 12/29/2022 2.940  0.270 - 4.200 uIU/mL Final    Free T4 12/29/2022 1.10  0.93 - 1.70 ng/dL Final    Results may be falsely increased if patient taking Biotin.    Thyroid Peroxidase Antibody 12/29/2022 9  0 - 34 IU/mL Final    Thyroglobulin Ab 12/29/2022 <1.0  0.0 - 0.9 IU/mL Final    Thyroglobulin Antibody measured by [employer]    Vitamin B-12 12/29/2022 272  211 - 946 pg/mL Final    Results may be falsely increased if patient taking Biotin.    25 Hydroxy, Vitamin D 12/29/2022 12.9 (L)  30.0 - 100.0 ng/ml Final    Comment: Results may be falsely increased if patient taking Biotin.  Reference Range for Total Vitamin D 25(OH)  Deficiency <20.0 ng/mL  Insufficiency 21-29 ng/mL  Sufficiency  ng/mL  Toxicity >100 ng/ml           Assessment & Plan   Diagnoses and all orders for this visit:    1. Moderate episode of recurrent major depressive disorder (Primary)    2. AMELIA (generalized anxiety disorder)    3. Chronic post-traumatic stress disorder (PTSD)        Visit Diagnoses:    ICD-10-CM ICD-9-CM   1. Moderate episode of recurrent major depressive disorder  F33.1 296.32   2. AMELIA (generalized anxiety disorder)  F41.1 300.02   3. Chronic post-traumatic stress  disorder (PTSD)  F43.12 309.81       Formulation:  22 yo with major trauma history presenting for follow up evaluation and management of anxiety/irritability. Primary concerns are short fuse/irritability, and struggles with motivation/distrust of individuals. Trauma history suggest Complex PTSD as the primary etiology, with patient also meeting criteria for AMELIA and MDD.      Today patient is doing alright, no acute concerns other than struggles with sleep, in particular nightmares. Will plan to start Prazosin 1 mg qhs    Plan:  #Complex PTSD  #AMELIA  #MDD, moderate, recurrent  - Continue Zoloft to 150 mg qday  - Continue  Wellbutrin to 150 mg qday for depression/motivation/energy  - Continue OTC Melatonin  - Continue Hydroxyzine 10 mg PRN anxiety/insomnia  - Continue therapy as scheduled     Risk Assessment for Suicide/Harm To Self/Others: : Based on patient history, demographics and today's interview, Patient is considered to be at low acute risk for self harm/harm to others, though baseline risk is increased compared to general population due to significant psychiatric comorbidity. Protective factors include denial of current SI, future orientation and engagement in treatment.      GOALS:  Short Term Goals: Patient will be compliant with medication, and patient will have no significant medication related side effects.  Patient will be engaged in psychotherapy as indicated.  Patient will report subjective improvement of symptoms.  Long term goals: To stabilize mood and treat/improve subjective symptoms, the patient will stay out of the hospital, the patient will be at an optimal level of functioning, and the patient will take all medications as prescribed.  The patient/guardian verbalized understanding and agreement with goals that were mutually set.      TREATMENT PLAN: Continue supportive psychotherapy efforts and medications as indicated.  Pharmacological and Non-Pharmacological treatment options discussed during  today's visit. Patient/Guardian acknowledged and verbally consented with current treatment plan and was educated on the importance of compliance with treatment and follow-up appointments.      MEDICATION ISSUES:  Discussed medication options and treatment plan of prescribed medication as well as the risks, benefits, any black box warnings, and side effects including potential falls, possible impaired driving, and metabolic adversities among others. Patient is agreeable to call the office with any worsening of symptoms or onset of side effects, or if any concerns or questions arise.  The contact information for the office is made available to the patient. Patient is agreeable to call 911 or go to the nearest ER should they begin having any SI/HI, or if any urgent concerns arise. No medication side effects or related complaints today.     MEDS ORDERED DURING VISIT:  No orders of the defined types were placed in this encounter.      MEDS DISCONTINUED DURING VISIT:   There are no discontinued medications.     Follow Up Appointment:   1 month           This document has been electronically signed by Jose Alfredo Weaver MD  July 25, 2024 14:49 EDT

## 2024-07-31 DIAGNOSIS — F41.1 GAD (GENERALIZED ANXIETY DISORDER): ICD-10-CM

## 2024-07-31 DIAGNOSIS — F33.1 MODERATE EPISODE OF RECURRENT MAJOR DEPRESSIVE DISORDER: ICD-10-CM

## 2024-07-31 RX ORDER — SERTRALINE HYDROCHLORIDE 100 MG/1
150 TABLET, FILM COATED ORAL DAILY
Qty: 30 TABLET | Refills: 0 | Status: SHIPPED | OUTPATIENT
Start: 2024-07-31

## 2024-08-12 ENCOUNTER — TELEMEDICINE (OUTPATIENT)
Dept: PSYCHIATRY | Facility: CLINIC | Age: 24
End: 2024-08-12
Payer: COMMERCIAL

## 2024-08-12 DIAGNOSIS — F33.1 MODERATE EPISODE OF RECURRENT MAJOR DEPRESSIVE DISORDER: Primary | ICD-10-CM

## 2024-08-12 DIAGNOSIS — F43.12 CHRONIC POST-TRAUMATIC STRESS DISORDER (PTSD): ICD-10-CM

## 2024-08-12 DIAGNOSIS — F41.1 GAD (GENERALIZED ANXIETY DISORDER): ICD-10-CM

## 2024-08-12 PROCEDURE — 90837 PSYTX W PT 60 MINUTES: CPT

## 2024-08-12 NOTE — PROGRESS NOTES
Date: 2024  Time In: 13:59 EDT  Time out: 2:52 PM EST    This provider is located at [facility], [address], [place], [facility], using a secure [employer] Video Visit through "[employer] Patient is being seen remotely via telehealth at their home address is located in [place]. Patient stated they are in a secure environment for this session. The patient's condition being diagnosed and treated is appropriate for telemedicine. The provider identified themself as well as their credentials. The patient, or  patient's legal guardian consent to be seen remotely, and when consent is given they understand that the consent allows for patient identifiable information to be sent to a third party as needed. They may refuse to be seen remotely at any time. The electronic data is encrypted and password protected, and the patient's or  legal guardian has been advised of the potential risks to privacy not withstanding such measures.   PT Identifiers used: Name and .    You have chosen to receive care through a telehealth visit.  Do you consent to use a video/audio connection for your medical care today? Yes    Subjective   Estela Santos is a 24 y.o. female who presents today for follow up    Chief Complaint:   Chief Complaint   Patient presents with    Anxiety    Depression    PTSD        Data: Pt reported she has not been attending family therapy with her mom recently. Pt reported that she moved since last session, now living in a neighborhood with her now step mom and father. Pt reported seeking employment now that she lives closer to town, currently has no transportation. Pt reported depression is feeling like 2/10 and anxiety feeling around 4/10 (scale 1-10, 10 being the worst). Pt reported employment would provide her with transportation and housing eventually. Pt reported struggling to find a routine and minimizing self care at the moment. Pt reported she will be getting blood work  done to determine if vitamin D is playing a factor in lack of motivation/energy levels. Pt denies any SI or self harm thoughts recently.      Clinical Maneuvering/Intervention: CBT techniques, processing, validation. Assisted patient in processing above session content; acknowledged and normalized patient’s thoughts, feelings, and concerns.  Rationalized patient thought process regarding concerns presented at session.  Discussed triggers associated with patient's  anxiety , depression , and PTSD Also discussed coping skills for patient to implement such as grounding , mindfulness , self care , and positive self talk increasing activity, journal about cousin , cooking.    Allowed patient to freely discuss issues without interruption or judgment. Provided safe, confidential environment to facilitate the development of positive therapeutic relationship and encourage open, honest communication. Assisted patient in identifying risk factors which would indicate the need for higher level of care including thoughts to harm self or others and/or self-harming behavior and encouraged patient to contact this office, call 911, or present to the nearest emergency room should any of these events occur. Discussed crisis intervention services and means to access. Patient adamantly and convincingly denies current suicidal or homicidal ideation or perceptual disturbance.    Assessment: Pt was alert and oriented x3. Pt was located in a secure location for confidentiality/privacy. Pt appears motivated to improve MH and overall quality of life. Pt appears to have more opportunities with the recent move. Pt appears to continue to struggle with relationship with mom, not currently attending family therapy. Pt appears to have some sadness towards partner because they have a good relationship with their parents. Depression and anxiety appear to be improving overall. Pt appears to struggle with follow through on tasks at times due to  low motivation. Continue to assess for SI and self harm as needed.    Patient appears to maintain relative stability as compared to their baseline.  However, patient continues to struggle with   Chief Complaint   Patient presents with    Anxiety    Depression    PTSD    which continues to cause impairment in important areas of functioning.  A result, they can be reasonably expected to continue to benefit from treatment and would likely be at increased risk for decompensation otherwise.        Mental Status Exam:   Hygiene:   good  Cooperation:  Cooperative  Eye Contact:  Good  Psychomotor Behavior:  Appropriate  Affect:  Appropriate  Mood: normal  Speech:  Normal  Thought Process:  Linear  Thought Content:  Normal  Suicidal:  None  Homicidal:  None  Hallucinations:  None  Delusion:  None  Memory:  Intact  Orientation:  Person, Place, and Time  Reliability:  fair  Insight:  Fair  Judgement:  Fair  Impulse Control:  Fair  Physical/Medical Issues:  No        Patient's Support Network Includes:  father and extended family    Functional Status: No impairment    Progress toward goal: Not at goal    Prognosis: Fair with Ongoing Treatment     Plan:     Patient will continue in individual outpatient therapy with focus on improved functioning and coping skills, maintaining stability, and avoiding decompensation and the need for higher level of care.    Patient will adhere to medication regimen as prescribed and report any side effects. Patient will contact this office, call 911 or present to the nearest emergency room should suicidal or homicidal ideations occur. Provide Cognitive Behavioral Therapy and Solution Focused Therapy to improve functioning, maintain stability, and avoid decompensation and the need for higher level of care.     Return in about 2 weeks (around 8/26/2024).      VISIT DIAGNOSIS:    Diagnosis Plan   1. Moderate episode of recurrent major depressive disorder        2. AMELIA (generalized anxiety disorder)         3. Chronic post-traumatic stress disorder (PTSD)         13:59 EDT         This document has been electronically signed by Hortencia Manjarrez LCSW  August 12, 2024      Part of this note may be an electronic transcription/translation of spoken language to printed text using the Dragon Dictation System.

## 2024-08-22 ENCOUNTER — TELEMEDICINE (OUTPATIENT)
Dept: PSYCHIATRY | Facility: CLINIC | Age: 24
End: 2024-08-22
Payer: COMMERCIAL

## 2024-08-22 DIAGNOSIS — F43.12 CHRONIC POST-TRAUMATIC STRESS DISORDER (PTSD): ICD-10-CM

## 2024-08-22 DIAGNOSIS — F33.1 MODERATE EPISODE OF RECURRENT MAJOR DEPRESSIVE DISORDER: Primary | ICD-10-CM

## 2024-08-22 DIAGNOSIS — F41.1 GAD (GENERALIZED ANXIETY DISORDER): ICD-10-CM

## 2024-08-22 NOTE — PROGRESS NOTES
This provider is located at the [facility] (through [facility]), [address], [place] [address], using a secure [employer] Video Visit through [employer]. Patient is being seen remotely via telehealth at their home address in [place], and stated they are in a secure environment for this session. The patient's condition being diagnosed/treated is appropriate for telemedicine. The provider identified herself as well as her credentials.  The patient, and/or patients guardian, consent to be seen remotely, and when consent is given they understand that the consent allows for patient identifiable information to be sent to a third party as needed.   They may refuse to be seen remotely at any time. The electronic data is encrypted and password protected, and the patient and/or guardian has been advised of the potential risks to privacy not withstanding such measures.    You have chosen to receive care through a telehealth visit.  Do you consent to use a video/audio connection for your medical care today? Yes    Patient identifiers utilized: Name and date of birth.    Patient verbally confirmed consent for today's encounter:  August 22, 2024  Subjective     Estela Santos is a 24 y.o. female who presents today for follow up    Chief Complaint:    Chief Complaint   Patient presents with    Depression        History of Present Illness:    - Estela Santos is a 24 y.o. patient presenting for follow up of depression. At the previous visit, prazosin 1 mg   - Today, patient is doing fairly well. She says that overall her anxiety and depression are significantly improved. She feels her current situation is much better. She enjoys her new home and enjoys having an actual neighborhood to walk around in.  - No acute concerns for this provider at this time.  - Patient states that she started going to [employer] with her mother. She really enjoys it as a progressive. Has started going to [employer]  "study there.         Current Medications:  Wellbutrin  mg qday  Zoloft 150 mg qday  Prazosin, though has been struggling with a 'tightish' throat when she wakes up.    Side Effects: Did struggle with some 'throat tightening' and elevated heart rate in the morning following taking Prazosin. She says she had a similar reaction to Lisinopril in the past  Sleep: Improving due to melatonin and magnesium. Not feeling as anxious as she was before  Mood: \"Happy\"  SI/HI/AVH: Denies  Overall Function: Improved      The following portions of the patient's history were reviewed and updated as appropriate: allergies, current medications, past family history, past medical history, past social history, past surgical history and problem list.        Past Medical History:  Past Medical History:   Diagnosis Date    Anxiety 2016    Depression 2015    Hypertension 2016       Substance Abuse History:   Types:Denies all, including illicit  Withdrawal Symptoms:Denies  Longest Period Sober:Not Applicable   Interest In Treatment: N/A      Social History:  Social History     Socioeconomic History    Marital status: Single   Tobacco Use    Smoking status: Never     Passive exposure: Never    Smokeless tobacco: Never   Substance and Sexual Activity    Alcohol use: Never    Drug use: Never    Sexual activity: Yes     Partners: Male     Birth control/protection: Condom, Natural family planning/Rhythm       Family History:  Family History   Problem Relation Age of Onset    Depression Mother     Mental illness Mother     Thyroid disease Mother         Has Hashimoto    Hyperlipidemia Father     Thyroid disease Maternal Grandmother         Has MS and other autoimmune diseases    Diabetes Paternal Grandmother     Hyperlipidemia Paternal Grandmother     Alcohol abuse Maternal Uncle         Passed away due to heart attack from alcoholism    Alcohol abuse Maternal Uncle         Liver failure due to alcoholism    Thyroid disease Maternal Aunt       "   Has ms and other autoimmune diseases    Thyroid disease Maternal Aunt        Past Surgical History:  History reviewed. No pertinent surgical history.    Problem List:  Patient Active Problem List   Diagnosis    Essential hypertension    AMELIA (generalized anxiety disorder)    Moderate episode of recurrent major depressive disorder       Allergy:   No Known Allergies     Current Medications:   Current Outpatient Medications   Medication Sig Dispense Refill    buPROPion XL (Wellbutrin XL) 150 MG 24 hr tablet Take 1 tablet by mouth Every Morning for 90 days. 30 tablet 2    NIFEdipine XL (PROCARDIA XL) 60 MG 24 hr tablet TAKE 1 TABLET BY MOUTH DAILY 14 tablet 0    sertraline (Zoloft) 100 MG tablet Take 1.5 tablets by mouth Daily. 30 tablet 0     No current facility-administered medications for this visit.       Review of Symptoms:    Review of Systems   Psychiatric/Behavioral:  Negative for behavioral problems, sleep disturbance, suicidal ideas and depressed mood. The patient is not nervous/anxious.    All other systems reviewed and are negative.      Physical Exam:   Physical Exam  Constitutional:       Appearance: Normal appearance. She is not toxic-appearing.   Neurological:      Mental Status: She is alert.   Psychiatric:         Mood and Affect: Mood normal.         Behavior: Behavior normal.         Thought Content: Thought content normal.         Judgment: Judgment normal.         Vitals:  There were no vitals taken for this visit.   There is no height or weight on file to calculate BMI.    Last 3 Blood Pressure Readings:  BP Readings from Last 3 Encounters:   11/16/23 130/82   09/29/23 150/68   07/10/23 160/80       PHQ-9 Score:   PHQ-9 Total Score: (P) 9     AMELIA-7 Score:   Feeling nervous, anxious or on edge: (P) Several days  Not being able to stop or control worrying: (P) Several days  Worrying too much about different things: (P) Several days  Trouble Relaxing: (P) Not at all  Being so restless that it is  hard to sit still: (P) More than half the days  Feeling afraid as if something awful might happen: (P) Not at all  Becoming easily annoyed or irritable: (P) More than half the days  AMELIA 7 Total Score: (P) 7  If you checked any problems, how difficult have these problems made it for you to do your work, take care of things at home, or get along with other people: (P) Somewhat difficult     Mental Status Exam:   Hygiene:   good  Cooperation:  Cooperative  Eye Contact:  Good  Psychomotor Behavior:  Appropriate  Affect:  Full range  and Appropriate   Mood: euthymic  Hopelessness: Denies  Speech:  Normal  Thought Process:  Goal directed and Linear  Thought Content:  Normal  Suicidal:  None  Homicidal:  None  Hallucinations:  None  Delusion:  None  Memory:  Intact  Orientation:  Grossly intact  Reliability:  good  Insight:  Fair  Judgement:  Fair  Impulse Control:  Fair  Physical/Medical Issues:  Denies       Lab Results:   No visits with results within 3 Month(s) from this visit.   Latest known visit with results is:   Office Visit on 12/12/2022   Component Date Value Ref Range Status    WBC 12/29/2022 8.40  3.40 - 10.80 10*3/mm3 Final    RBC 12/29/2022 5.20  3.77 - 5.28 10*6/mm3 Final    Hemoglobin 12/29/2022 13.3  12.0 - 15.9 g/dL Final    Hematocrit 12/29/2022 41.7  34.0 - 46.6 % Final    MCV 12/29/2022 80.2  79.0 - 97.0 fL Final    MCH 12/29/2022 25.6 (L)  26.6 - 33.0 pg Final    MCHC 12/29/2022 31.9  31.5 - 35.7 g/dL Final    RDW 12/29/2022 14.9  12.3 - 15.4 % Final    Platelets 12/29/2022 347  140 - 450 10*3/mm3 Final    Glucose 12/29/2022 100 (H)  65 - 99 mg/dL Final    BUN 12/29/2022 7  6 - 20 mg/dL Final    Creatinine 12/29/2022 0.62  0.57 - 1.00 mg/dL Final    EGFR Result 12/29/2022 130.9  >60.0 mL/min/1.73 Final    Comment: National Kidney Foundation and American Society of  Nephrology (ASN) Task Force recommended calculation based  on the Chronic Kidney Disease Epidemiology Collaboration  (CKD-EPI) equation  refit without adjustment for race.  GFR Normal >60  Chronic Kidney Disease <60  Kidney Failure <15      BUN/Creatinine Ratio 12/29/2022 11.3  7.0 - 25.0 Final    Sodium 12/29/2022 138  136 - 145 mmol/L Final    Potassium 12/29/2022 4.1  3.5 - 5.2 mmol/L Final    Chloride 12/29/2022 106  98 - 107 mmol/L Final    Total CO2 12/29/2022 24.2  22.0 - 29.0 mmol/L Final    Calcium 12/29/2022 9.1  8.6 - 10.5 mg/dL Final    Total Protein 12/29/2022 7.2  6.0 - 8.5 g/dL Final    Albumin 12/29/2022 4.3  3.5 - 5.2 g/dL Final    Globulin 12/29/2022 2.9  gm/dL Final    A/G Ratio 12/29/2022 1.5  g/dL Final    Total Bilirubin 12/29/2022 0.3  0.0 - 1.2 mg/dL Final    Alkaline Phosphatase 12/29/2022 83  39 - 117 U/L Final    AST (SGOT) 12/29/2022 24  1 - 32 U/L Final    ALT (SGPT) 12/29/2022 33  1 - 33 U/L Final    Hemoglobin A1C 12/29/2022 5.40  4.80 - 5.60 % Final    Comment: Hemoglobin A1C Ranges:  Increased Risk for Diabetes  5.7% to 6.4%  Diabetes                     >= 6.5%  Diabetic Goal                < 7.0%      Hep C Virus Ab 12/29/2022 0.2  0.0 - 0.9 s/co ratio Final    Comment:                                   Negative:     < 0.8                               Indeterminate: 0.8 - 0.9                                    Positive:     > 0.9   HCV antibody alone does not differentiate between   previous resolved infection and active infection.   The CDC and current clinical guidelines recommend   that a positive HCV antibody result be followed up   with an HCV RNA test to support the diagnosis of   acute HCV infection. [facility] offers Hepatitis C   Virus (HCV) RNA, Diagnosis, GENEVIEVE (449862) and   Hepatitis C Virus (HCV) Antibody with reflex to   Quantitative Real-time PCR ([id number]).      Total Cholesterol 12/29/2022 145  0 - 200 mg/dL Final    Comment: Cholesterol Reference Ranges  (U.S. Department of Health and Human Services ATP III  Classifications)  Desirable          <200 mg/dL  Borderline High    200-239 mg/dL  High Risk           >240 mg/dL  Triglyceride Reference Ranges  (U.S. Department of Health and Human Services ATP III  Classifications)  Normal           <150 mg/dL  Borderline High  150-199 mg/dL  High             200-499 mg/dL  Very High        >500 mg/dL  HDL Reference Ranges  (U.S. Department of Health and Human Services ATP III  Classifications)  Low     <40 mg/dl (major risk factor for CHD)  High    >60 mg/dl ('negative' risk factor for CHD)  LDL Reference Ranges  (U.S. Department of Health and Human Services ATP III  Classifications)  Optimal          <100 mg/dL  Near Optimal     100-129 mg/dL  Borderline High  130-159 mg/dL  High             160-189 mg/dL  Very High        >189 mg/dL      Triglycerides 12/29/2022 115  0 - 150 mg/dL Final    HDL Cholesterol 12/29/2022 29 (L)  40 - 60 mg/dL Final    VLDL Cholesterol Ray 12/29/2022 21  5 - 40 mg/dL Final    LDL Chol Calc (NIH) 12/29/2022 95  0 - 100 mg/dL Final    Chol/HDL Ratio 12/29/2022 5.00   Final    TSH 12/29/2022 2.940  0.270 - 4.200 uIU/mL Final    Free T4 12/29/2022 1.10  0.93 - 1.70 ng/dL Final    Results may be falsely increased if patient taking Biotin.    Thyroid Peroxidase Antibody 12/29/2022 9  0 - 34 IU/mL Final    Thyroglobulin Ab 12/29/2022 <1.0  0.0 - 0.9 IU/mL Final    Thyroglobulin Antibody measured by [employer]    Vitamin B-12 12/29/2022 272  211 - 946 pg/mL Final    Results may be falsely increased if patient taking Biotin.    25 Hydroxy, Vitamin D 12/29/2022 12.9 (L)  30.0 - 100.0 ng/ml Final    Comment: Results may be falsely increased if patient taking Biotin.  Reference Range for Total Vitamin D 25(OH)  Deficiency <20.0 ng/mL  Insufficiency 21-29 ng/mL  Sufficiency  ng/mL  Toxicity >100 ng/ml           Assessment & Plan   Diagnoses and all orders for this visit:    1. Moderate episode of recurrent major depressive disorder (Primary)    2. AMELIA (generalized anxiety disorder)    3. Chronic post-traumatic stress disorder  (PTSD)        Visit Diagnoses:    ICD-10-CM ICD-9-CM   1. Moderate episode of recurrent major depressive disorder  F33.1 296.32   2. AMELIA (generalized anxiety disorder)  F41.1 300.02   3. Chronic post-traumatic stress disorder (PTSD)  F43.12 309.81       Formulation:  22 yo with major trauma history presenting for follow up evaluation and management of anxiety/irritability. Primary concerns are short fuse/irritability, and struggles with motivation/distrust of individuals. Trauma history suggest Complex PTSD as the primary etiology, with patient also meeting criteria for AMELIA and MDD.      Today patient is doing well. Only concern is some side effects with prazosin. Otherwise, no issues. Will DC prazosin and follow up in 2 months     Plan:  #Complex PTSD  #AMELIA  #MDD, moderate, recurrent  - Continue Zoloft to 150 mg qday  - Continue  Wellbutrin to 150 mg qday for depression/motivation/energy  - DC Prazosin  - Continue OTC Melatonin  - Continue therapy as scheduled     Risk Assessment for Suicide/Harm To Self/Others: : Based on patient history, demographics and today's interview, Patient is considered to be at low acute risk for self harm/harm to others, though baseline risk is increased compared to general population due to significant psychiatric comorbidity. Protective factors include denial of current SI, future orientation and engagement in treatment.      GOALS:  Short Term Goals: Patient will be compliant with medication, and patient will have no significant medication related side effects.  Patient will be engaged in psychotherapy as indicated.  Patient will report subjective improvement of symptoms.  Long term goals: To stabilize mood and treat/improve subjective symptoms, the patient will stay out of the hospital, the patient will be at an optimal level of functioning, and the patient will take all medications as prescribed.  The patient/guardian verbalized understanding and agreement with goals that were  mutually set.      TREATMENT PLAN: Continue supportive psychotherapy efforts and medications as indicated.  Pharmacological and Non-Pharmacological treatment options discussed during today's visit. Patient/Guardian acknowledged and verbally consented with current treatment plan and was educated on the importance of compliance with treatment and follow-up appointments.      MEDICATION ISSUES:  Discussed medication options and treatment plan of prescribed medication as well as the risks, benefits, any black box warnings, and side effects including potential falls, possible impaired driving, and metabolic adversities among others. Patient is agreeable to call the office with any worsening of symptoms or onset of side effects, or if any concerns or questions arise.  The contact information for the office is made available to the patient. Patient is agreeable to call 911 or go to the nearest ER should they begin having any SI/HI, or if any urgent concerns arise. No medication side effects or related complaints today.     MEDS ORDERED DURING VISIT:  No orders of the defined types were placed in this encounter.      MEDS DISCONTINUED DURING VISIT:   Medications Discontinued During This Encounter   Medication Reason    hydrOXYzine (ATARAX) 10 MG tablet     prazosin (MINIPRESS) 1 MG capsule         Follow Up Appointment:   2 months           This document has been electronically signed by Jose Alfredo Weaver MD  August 22, 2024 12:46 EDT

## 2024-08-26 ENCOUNTER — TELEMEDICINE (OUTPATIENT)
Dept: PSYCHIATRY | Facility: CLINIC | Age: 24
End: 2024-08-26
Payer: COMMERCIAL

## 2024-08-26 DIAGNOSIS — F33.1 MODERATE EPISODE OF RECURRENT MAJOR DEPRESSIVE DISORDER: Primary | ICD-10-CM

## 2024-08-26 DIAGNOSIS — F41.1 GAD (GENERALIZED ANXIETY DISORDER): ICD-10-CM

## 2024-08-26 DIAGNOSIS — F43.12 CHRONIC POST-TRAUMATIC STRESS DISORDER (PTSD): ICD-10-CM

## 2024-08-26 DIAGNOSIS — F33.1 MODERATE EPISODE OF RECURRENT MAJOR DEPRESSIVE DISORDER: ICD-10-CM

## 2024-08-26 PROCEDURE — 90834 PSYTX W PT 45 MINUTES: CPT

## 2024-08-26 RX ORDER — BUPROPION HYDROCHLORIDE 150 MG/1
150 TABLET ORAL EVERY MORNING
Qty: 30 TABLET | Refills: 2 | Status: SHIPPED | OUTPATIENT
Start: 2024-08-26 | End: 2024-11-24

## 2024-08-26 RX ORDER — SERTRALINE HYDROCHLORIDE 100 MG/1
150 TABLET, FILM COATED ORAL DAILY
Qty: 30 TABLET | Refills: 0 | Status: SHIPPED | OUTPATIENT
Start: 2024-08-26

## 2024-08-26 NOTE — PROGRESS NOTES
Date: 2024  Time In: 13:30 EDT  Time out: 2:08 PM EST    This provider is located at [facility], [address], [place], [facility], using a secure [employer] Video Visit through [employer]. Patient is being seen remotely via telehealth at their home address is located in [place]. Patient stated they are in a secure environment for this session. The patient's condition being diagnosed and treated is appropriate for telemedicine. The provider identified themself as well as their credentials. The patient, or  patient's legal guardian consent to be seen remotely, and when consent is given they understand that the consent allows for patient identifiable information to be sent to a third party as needed. They may refuse to be seen remotely at any time. The electronic data is encrypted and password protected, and the patient's or  legal guardian has been advised of the potential risks to privacy not withstanding such measures.   PT Identifiers used: Name and .    You have chosen to receive care through a telehealth visit.  Do you consent to use a video/audio connection for your medical care today? Yes    Subjective   Estela Santos is a 24 y.o. female who presents today for follow up    Chief Complaint:   Chief Complaint   Patient presents with    Anxiety    Depression    PTSD        Data: Pt reported feeling pretty good  overall. 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 Pt reported she has been attending [employer] with her mom. Pt reported feeling a sense of community. Pt reported she has been staying with her mom more and relationship is going well. Pt and her mother continue to attend family therapy. Pt denies conflict with her mother since she has been staying there more. Pt reported she has been driving more, adjusting to this and getting more comfortable.      Clinical Maneuvering/Intervention: Strength based approach, validation, processing.  Assisted patient in processing above session content; acknowledged and normalized patient’s thoughts, feelings, and concerns.  Rationalized patient thought process regarding concerns presented at session.  Discussed triggers associated with patient's  anxiety , depression , and PTSD Also discussed coping skills for patient to implement such as grounding , mindfulness , self care , and positive self talk     Allowed patient to freely discuss issues without interruption or judgment. Provided safe, confidential environment to facilitate the development of positive therapeutic relationship and encourage open, honest communication. Assisted patient in identifying risk factors which would indicate the need for higher level of care including thoughts to harm self or others and/or self-harming behavior and encouraged patient to contact this office, call 911, or present to the nearest emergency room should any of these events occur. Discussed crisis intervention services and means to access. Patient adamantly and convincingly denies current suicidal or homicidal ideation or perceptual disturbance.    Assessment: Pt was alert and oriented x3. Pt was located in a secure location for confidentiality/privacy. Pt appears motivated to improve MH and overall quality of life. Pt appears to have goals for the future and working towards them. Pt appears to have a good mindset going into employment opportunity. Pt appears to be in good spirits today. Pt appears to have a normal amount of anxiety re driving and adjusting to this. Pt appears to allow herself space to process emotions instead of avoiding. Relationship with mom appears to be improving. Pt appeared receptive to encouragements and techniques discussed in session.    Patient appears to maintain relative stability as compared to their baseline.  However, patient continues to struggle with   Chief Complaint   Patient presents with    Anxiety    Depression    PTSD    which  continues to cause impairment in important areas of functioning.  A result, they can be reasonably expected to continue to benefit from treatment and would likely be at increased risk for decompensation otherwise.      Mental Status Exam:   Hygiene:   good  Cooperation:  Cooperative  Eye Contact:  Good  Psychomotor Behavior:  Appropriate  Affect:  Appropriate  Mood: normal  Speech:  Normal  Thought Process:  Linear  Thought Content:  Normal  Suicidal:  None  Homicidal:  None  Hallucinations:  None  Delusion:  None  Memory:  Intact  Orientation:  Person, Place, and Time  Reliability:  fair  Insight:  Fair  Judgement:  Fair  Impulse Control:  Fair  Physical/Medical Issues:  No        Patient's Support Network Includes:  father and partner    Functional Status: No impairment    Progress toward goal: Not at goal    Prognosis: Fair with Ongoing Treatment     Plan:     Patient will continue in individual outpatient therapy with focus on improved functioning and coping skills, maintaining stability, and avoiding decompensation and the need for higher level of care.    Patient will adhere to medication regimen as prescribed and report any side effects. Patient will contact this office, call 911 or present to the nearest emergency room should suicidal or homicidal ideations occur. Provide Cognitive Behavioral Therapy and Solution Focused Therapy to improve functioning, maintain stability, and avoid decompensation and the need for higher level of care.     Return in about 4 weeks (around 9/23/2024).      VISIT DIAGNOSIS:    Diagnosis Plan   1. Moderate episode of recurrent major depressive disorder        2. AMELIA (generalized anxiety disorder)        3. Chronic post-traumatic stress disorder (PTSD)         13:30 EDT         This document has been electronically signed by Hortecnia Manjarrez LCSW  August 26, 2024      Part of this note may be an electronic transcription/translation of spoken language to printed text using the  [facility] Dictation System.

## 2024-09-12 DIAGNOSIS — F41.1 GAD (GENERALIZED ANXIETY DISORDER): ICD-10-CM

## 2024-09-12 DIAGNOSIS — F33.1 MODERATE EPISODE OF RECURRENT MAJOR DEPRESSIVE DISORDER: ICD-10-CM

## 2024-09-16 RX ORDER — SERTRALINE HYDROCHLORIDE 100 MG/1
150 TABLET, FILM COATED ORAL DAILY
Qty: 30 TABLET | Refills: 0 | Status: SHIPPED | OUTPATIENT
Start: 2024-09-16

## 2024-09-25 ENCOUNTER — TELEMEDICINE (OUTPATIENT)
Dept: PSYCHIATRY | Facility: CLINIC | Age: 24
End: 2024-09-25
Payer: COMMERCIAL

## 2024-09-25 DIAGNOSIS — F41.1 GAD (GENERALIZED ANXIETY DISORDER): ICD-10-CM

## 2024-09-25 DIAGNOSIS — F43.12 CHRONIC POST-TRAUMATIC STRESS DISORDER (PTSD): ICD-10-CM

## 2024-09-25 DIAGNOSIS — F33.1 MODERATE EPISODE OF RECURRENT MAJOR DEPRESSIVE DISORDER: Primary | ICD-10-CM

## 2024-10-16 DIAGNOSIS — F41.1 GAD (GENERALIZED ANXIETY DISORDER): ICD-10-CM

## 2024-10-16 DIAGNOSIS — F33.1 MODERATE EPISODE OF RECURRENT MAJOR DEPRESSIVE DISORDER: ICD-10-CM

## 2024-10-17 RX ORDER — SERTRALINE HYDROCHLORIDE 100 MG/1
150 TABLET, FILM COATED ORAL DAILY
Qty: 30 TABLET | Refills: 0 | Status: SHIPPED | OUTPATIENT
Start: 2024-10-17

## 2024-10-23 ENCOUNTER — TELEMEDICINE (OUTPATIENT)
Dept: PSYCHIATRY | Facility: CLINIC | Age: 24
End: 2024-10-23
Payer: COMMERCIAL

## 2024-10-23 DIAGNOSIS — F41.1 GAD (GENERALIZED ANXIETY DISORDER): Primary | ICD-10-CM

## 2024-10-23 DIAGNOSIS — F33.1 MODERATE EPISODE OF RECURRENT MAJOR DEPRESSIVE DISORDER: ICD-10-CM

## 2024-10-23 DIAGNOSIS — F43.12 CHRONIC POST-TRAUMATIC STRESS DISORDER (PTSD): ICD-10-CM

## 2024-10-23 NOTE — PROGRESS NOTES
Date: 2024  Time In: 15:02 EDT  Time out: 3:55 PM EST    This provider is located at [facility], [facility] [place], [place], [facility], using a secure [employer] Video Visit through [employer]. Patient is being seen remotely via telehealth at their home address is located in [place]. Patient stated they are in a secure environment for this session. The patient's condition being diagnosed and treated is appropriate for telemedicine. The provider identified themself as well as their credentials. The patient, or  patient's legal guardian consent to be seen remotely, and when consent is given they understand that the consent allows for patient identifiable information to be sent to a third party as needed. They may refuse to be seen remotely at any time. The electronic data is encrypted and password protected, and the patient's or  legal guardian has been advised of the potential risks to privacy not withstanding such measures.   PT Identifiers used: Name and .    You have chosen to receive care through a telehealth visit.  Do you consent to use a video/audio connection for your medical care today? Yes    Subjective   Estela Santos is a 24 y.o. 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 PT reported that she has been living in her apartment on moms property. Pt reported getting her license and having some anxiety re driving in the early morning when it is dark out still. Pt reported some depression with the sun setting earlier. Pt reported that grief re cousin has been more present this month.      Clinical Maneuvering/Intervention: Assisted patient in processing above session content; acknowledged and normalized patient’s thoughts,  feelings, and concerns.  Rationalized patient thought process regarding concerns presented at session.  Discussed triggers associated with patient's  anxiety , depression , and PTSD Also discussed coping skills for patient to implement such as grounding , mindfulness , self care , and gratitude journal    Allowed patient to freely discuss issues without interruption or judgment. Provided safe, confidential environment to facilitate the development of positive therapeutic relationship and encourage open, honest communication. Assisted patient in identifying risk factors which would indicate the need for higher level of care including thoughts to harm self or others and/or self-harming behavior and encouraged patient to contact this office, call 911, or present to the nearest emergency room should any of these events occur. Discussed crisis intervention services and means to access. Patient adamantly and convincingly denies current suicidal or homicidal ideation or perceptual disturbance.    Assessment: Pt was alert and oriented x3. Pt was located in a secure location for confidentiality/privacy. Pt appears motivated to improve MH and overall quality of life. Pt appeared in good spirits today. Pt is able to identify that depression has increased some with time changing soon. Pt appears to have a normal grief reaction, ups and downs. Overall pts MH appears to have improved over the last few months. Routine and accomplishments within employment appear to have aided.    Patient appears to maintain relative stability as compared to their baseline.  However, patient continues to struggle with   Chief Complaint   Patient presents with    Anxiety    Depression    PTSD    which continues to cause impairment in important areas of functioning.  A result, they can be reasonably expected to continue to benefit from treatment and would likely be at increased risk for decompensation otherwise.    Mental Status Exam:   Hygiene:    good  Cooperation:  Cooperative  Eye Contact:  Good  Psychomotor Behavior:  Appropriate  Affect:  Appropriate  Mood: normal  Speech:  Normal  Thought Process:  Linear  Thought Content:  Normal  Suicidal:  None  Homicidal:  None  Hallucinations:  None  Delusion:  None  Memory:  Intact  Orientation:  Person, Place, and Time  Reliability:  fair  Insight:  Fair  Judgement:  Fair  Impulse Control:  Fair  Physical/Medical Issues:  No        Patient's Support Network Includes:  significant other    Functional Status: No impairment    Progress toward goal: Not at goal    Prognosis: Fair with Ongoing Treatment     Plan:     Patient will continue in individual outpatient therapy with focus on improved functioning and coping skills, maintaining stability, and avoiding decompensation and the need for higher level of care.    Patient will adhere to medication regimen as prescribed and report any side effects. Patient will contact this office, call 911 or present to the nearest emergency room should suicidal or homicidal ideations occur. Provide Cognitive Behavioral Therapy and Solution Focused Therapy to improve functioning, maintain stability, and avoid decompensation and the need for higher level of care.     Return in about 4 weeks (around 11/20/2024).      VISIT DIAGNOSIS:    Diagnosis Plan   1. AMELIA (generalized anxiety disorder)        2. Moderate episode of recurrent major depressive disorder        3. Chronic post-traumatic stress disorder (PTSD)         15:02 EDT         This document has been electronically signed by Hortencia Manjarrez LCSW  October 23, 2024      Part of this note may be an electronic transcription/translation of spoken language to printed text using the Dragon Dictation System.

## 2024-10-28 ENCOUNTER — TELEMEDICINE (OUTPATIENT)
Dept: PSYCHIATRY | Facility: CLINIC | Age: 24
End: 2024-10-28
Payer: COMMERCIAL

## 2024-10-28 DIAGNOSIS — F41.1 GAD (GENERALIZED ANXIETY DISORDER): ICD-10-CM

## 2024-10-28 DIAGNOSIS — F33.1 MODERATE EPISODE OF RECURRENT MAJOR DEPRESSIVE DISORDER: ICD-10-CM

## 2024-10-28 PROCEDURE — 96127 BRIEF EMOTIONAL/BEHAV ASSMT: CPT | Performed by: STUDENT IN AN ORGANIZED HEALTH CARE EDUCATION/TRAINING PROGRAM

## 2024-10-28 PROCEDURE — 99214 OFFICE O/P EST MOD 30 MIN: CPT | Performed by: STUDENT IN AN ORGANIZED HEALTH CARE EDUCATION/TRAINING PROGRAM

## 2024-10-28 RX ORDER — SERTRALINE HYDROCHLORIDE 100 MG/1
200 TABLET, FILM COATED ORAL DAILY
Qty: 30 TABLET | Refills: 1 | Status: SHIPPED | OUTPATIENT
Start: 2024-11-04

## 2024-10-28 NOTE — PROGRESS NOTES
This provider is located at [facility] [address] [facility] [address], [place] [address], using a secure [employer] Video Visit through [employer]. Patient is being seen remotely via telehealth at their home address in [place], and stated they are in a secure environment for this session. The patient's condition being diagnosed/treated is appropriate for telemedicine. The provider identified herself as well as her credentials.  The patient, and/or patients guardian, consent to be seen remotely, and when consent is given they understand that the consent allows for patient identifiable information to be sent to a third party as needed.   They may refuse to be seen remotely at any time. The electronic data is encrypted and password protected, and the patient and/or guardian has been advised of the potential risks to privacy not withstanding such measures.    You have chosen to receive care through a telehealth visit.  Do you consent to use a video/audio connection for your medical care today? Yes    Patient identifiers utilized: Name and date of birth.    Patient verbally confirmed consent for today's encounter:  October 28, 2024  Subjective     Estela Santos is a 24 y.o. female who presents today for follow up    Chief Complaint:    Chief Complaint   Patient presents with    Depression        History of Present Illness:    - Estela Santos is a 24 y.o. patient presenting for follow up of depression. At the previous visit, no changes were made as patient was stable  - Today, patient is is doing well, though does feel like she has a little bit of seasonal depression. She says that she has been grieving a little bit more over her cousin's passing. She feels like she is handling this fairly well, and feels like its a 'normal grieving process'. Does feel like she ruminates on things quite a bit.   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 She says that its been  "a good experience. She says she has very different beliefs from her coworkers, but feel they coexist really well. She says she just tries to be herself.   - Transitioned to taking some of her medicines before bed, feels like this helps her wake up.       Current Medications:  Wellbutrin  mg qday  Zoloft 150 mg qday    Side Effects: Denies any major side effects  Sleep: No major changes  Mood: \"Tired\"  SI/HI/AVH: Denies  Overall Function: Stable      The following portions of the patient's history were reviewed and updated as appropriate: allergies, current medications, past family history, past medical history, past social history, past surgical history and problem list.        Past Medical History:  Past Medical History:   Diagnosis Date    Anxiety 2016    Depression 2015    Hypertension 2016       Substance Abuse History:   Types:Denies all, including illicit  Withdrawal Symptoms:Denies  Longest Period Sober:Not Applicable   Interest In Treatment: N/A      Social History:  Social History     Socioeconomic History    Marital status: Single   Tobacco Use    Smoking status: Never     Passive exposure: Never    Smokeless tobacco: Never   Substance and Sexual Activity    Alcohol use: Never    Drug use: Never    Sexual activity: Yes     Partners: Male     Birth control/protection: Condom, Natural family planning/Rhythm       Family History:  Family History   Problem Relation Age of Onset    Depression Mother     Mental illness Mother     Thyroid disease Mother         Has Hashimoto    Hyperlipidemia Father     Thyroid disease Maternal Grandmother         Has MS and other autoimmune diseases    Diabetes Paternal Grandmother     Hyperlipidemia Paternal Grandmother     Alcohol abuse Maternal Uncle         Passed away due to heart attack from alcoholism    Alcohol abuse Maternal Uncle         Liver failure due to alcoholism    Thyroid disease Maternal Aunt         Has ms and other autoimmune diseases    Thyroid " disease Maternal Aunt        Past Surgical History:  History reviewed. No pertinent surgical history.    Problem List:  Patient Active Problem List   Diagnosis    Essential hypertension    AMELIA (generalized anxiety disorder)    Moderate episode of recurrent major depressive disorder       Allergy:   No Known Allergies     Current Medications:   Current Outpatient Medications   Medication Sig Dispense Refill    buPROPion XL (Wellbutrin XL) 150 MG 24 hr tablet Take 1 tablet by mouth Every Morning for 90 days. 30 tablet 2    NIFEdipine XL (PROCARDIA XL) 60 MG 24 hr tablet TAKE 1 TABLET BY MOUTH DAILY 14 tablet 0    sertraline (ZOLOFT) 100 MG tablet Take 1.5 tablets by mouth Daily. 30 tablet 0     No current facility-administered medications for this visit.       Review of Symptoms:    Review of Systems   Psychiatric/Behavioral:  Negative for behavioral problems, sleep disturbance, suicidal ideas and depressed mood. The patient is not nervous/anxious.    All other systems reviewed and are negative.      Physical Exam:   Physical Exam  Constitutional:       Appearance: Normal appearance. She is not toxic-appearing.   Neurological:      Mental Status: She is alert.   Psychiatric:         Mood and Affect: Mood normal.         Behavior: Behavior normal.         Thought Content: Thought content normal.         Judgment: Judgment normal.         Vitals:  There were no vitals taken for this visit.   There is no height or weight on file to calculate BMI.    Last 3 Blood Pressure Readings:  BP Readings from Last 3 Encounters:   11/16/23 130/82   09/29/23 150/68   07/10/23 160/80       PHQ-9 Score:   PHQ-9 Total Score: (Patient-Rptd) 9    AMELIA-7 Score:   Feeling nervous, anxious or on edge: (Patient-Rptd) Not at all  Not being able to stop or control worrying: (Patient-Rptd) More than half the days  Worrying too much about different things: (Patient-Rptd) Not at all  Trouble Relaxing: (Patient-Rptd) Several days  Being so restless  that it is hard to sit still: (Patient-Rptd) Not at all  Feeling afraid as if something awful might happen: (Patient-Rptd) Not at all  Becoming easily annoyed or irritable: (Patient-Rptd) Several days  AMELIA 7 Total Score: (Patient-Rptd) 4  If you checked any problems, how difficult have these problems made it for you to do your work, take care of things at home, or get along with other people: (Patient-Rptd) Somewhat difficult     Mental Status Exam:   Hygiene:   good  Cooperation:  Cooperative  Eye Contact:  Good  Psychomotor Behavior:  Appropriate  Affect:  Full range  and Appropriate   Mood: euthymic  Hopelessness: Denies  Speech:  Normal  Thought Process:  Goal directed and Linear  Thought Content:  Normal  Suicidal:  None  Homicidal:  None  Hallucinations:  None  Delusion:  None  Memory:  Intact  Orientation:  Grossly intact  Reliability:  good  Insight:  Fair  Judgement:  Michael  Impulse Control:  Fair  Physical/Medical Issues:  Denies       Lab Results:   No visits with results within 3 Month(s) from this visit.   Latest known visit with results is:   Office Visit on 12/12/2022   Component Date Value Ref Range Status    WBC 12/29/2022 8.40  3.40 - 10.80 10*3/mm3 Final    RBC 12/29/2022 5.20  3.77 - 5.28 10*6/mm3 Final    Hemoglobin 12/29/2022 13.3  12.0 - 15.9 g/dL Final    Hematocrit 12/29/2022 41.7  34.0 - 46.6 % Final    MCV 12/29/2022 80.2  79.0 - 97.0 fL Final    MCH 12/29/2022 25.6 (L)  26.6 - 33.0 pg Final    MCHC 12/29/2022 31.9  31.5 - 35.7 g/dL Final    RDW 12/29/2022 14.9  12.3 - 15.4 % Final    Platelets 12/29/2022 347  140 - 450 10*3/mm3 Final    Glucose 12/29/2022 100 (H)  65 - 99 mg/dL Final    BUN 12/29/2022 7  6 - 20 mg/dL Final    Creatinine 12/29/2022 0.62  0.57 - 1.00 mg/dL Final    EGFR Result 12/29/2022 130.9  >60.0 mL/min/1.73 Final    Comment: National Kidney Foundation and American Society of  Nephrology (ASN) Task Force recommended calculation based  on the Chronic Kidney Disease  Epidemiology Collaboration  (CKD-EPI) equation refit without adjustment for race.  GFR Normal >60  Chronic Kidney Disease <60  Kidney Failure <15      BUN/Creatinine Ratio 12/29/2022 11.3  7.0 - 25.0 Final    Sodium 12/29/2022 138  136 - 145 mmol/L Final    Potassium 12/29/2022 4.1  3.5 - 5.2 mmol/L Final    Chloride 12/29/2022 106  98 - 107 mmol/L Final    Total CO2 12/29/2022 24.2  22.0 - 29.0 mmol/L Final    Calcium 12/29/2022 9.1  8.6 - 10.5 mg/dL Final    Total Protein 12/29/2022 7.2  6.0 - 8.5 g/dL Final    Albumin 12/29/2022 4.3  3.5 - 5.2 g/dL Final    Globulin 12/29/2022 2.9  gm/dL Final    A/G Ratio 12/29/2022 1.5  g/dL Final    Total Bilirubin 12/29/2022 0.3  0.0 - 1.2 mg/dL Final    Alkaline Phosphatase 12/29/2022 83  39 - 117 U/L Final    AST (SGOT) 12/29/2022 24  1 - 32 U/L Final    ALT (SGPT) 12/29/2022 33  1 - 33 U/L Final    Hemoglobin A1C 12/29/2022 5.40  4.80 - 5.60 % Final    Comment: Hemoglobin A1C Ranges:  Increased Risk for Diabetes  5.7% to 6.4%  Diabetes                     >= 6.5%  Diabetic Goal                < 7.0%      Hep C Virus Ab 12/29/2022 0.2  0.0 - 0.9 s/co ratio Final    Comment:                                   Negative:     < 0.8                               Indeterminate: 0.8 - 0.9                                    Positive:     > 0.9   HCV antibody alone does not differentiate between   previous resolved infection and active infection.   The CDC and current clinical guidelines recommend   that a positive HCV antibody result be followed up   with an HCV RNA test to support the diagnosis of   acute HCV infection. [facility] offers Hepatitis C   Virus (HCV) RNA, Diagnosis, GENEVIEVE (090990) and   Hepatitis C Virus (HCV) Antibody with reflex to   Quantitative Real-time PCR (739876).      Total Cholesterol 12/29/2022 145  0 - 200 mg/dL Final    Comment: Cholesterol Reference Ranges  (U.S. Department of Health and Human Services ATP III  Classifications)  Desirable          <200  mg/dL  Borderline High    200-239 mg/dL  High Risk          >240 mg/dL  Triglyceride Reference Ranges  (U.S. Department of Health and Human Services ATP III  Classifications)  Normal           <150 mg/dL  Borderline High  150-199 mg/dL  High             200-499 mg/dL  Very High        >500 mg/dL  HDL Reference Ranges  (U.S. Department of Health and Human Services ATP III  Classifications)  Low     <40 mg/dl (major risk factor for CHD)  High    >60 mg/dl ('negative' risk factor for CHD)  LDL Reference Ranges  (U.S. Department of Health and Human Services ATP III  Classifications)  Optimal          <100 mg/dL  Near Optimal     100-129 mg/dL  Borderline High  130-159 mg/dL  High             160-189 mg/dL  Very High        >189 mg/dL      Triglycerides 12/29/2022 115  0 - 150 mg/dL Final    HDL Cholesterol 12/29/2022 29 (L)  40 - 60 mg/dL Final    VLDL Cholesterol Ray 12/29/2022 21  5 - 40 mg/dL Final    LDL Chol Calc (NIH) 12/29/2022 95  0 - 100 mg/dL Final    Chol/HDL Ratio 12/29/2022 5.00   Final    TSH 12/29/2022 2.940  0.270 - 4.200 uIU/mL Final    Free T4 12/29/2022 1.10  0.93 - 1.70 ng/dL Final    Results may be falsely increased if patient taking Biotin.    Thyroid Peroxidase Antibody 12/29/2022 9  0 - 34 IU/mL Final    Thyroglobulin Ab 12/29/2022 <1.0  0.0 - 0.9 IU/mL Final    Thyroglobulin Antibody measured by [email or web address]    Vitamin B-12 12/29/2022 272  211 - 946 pg/mL Final    Results may be falsely increased if patient taking Biotin.    25 Hydroxy, Vitamin D 12/29/2022 12.9 (L)  30.0 - 100.0 ng/ml Final    Comment: Results may be falsely increased if patient taking Biotin.  Reference Range for Total Vitamin D 25(OH)  Deficiency <20.0 ng/mL  Insufficiency 21-29 ng/mL  Sufficiency  ng/mL  Toxicity >100 ng/ml           Assessment & Plan   There are no diagnoses linked to this encounter.    Visit Diagnoses:  No diagnosis found.    Formulation:  24 yo with major trauma history presenting for  follow up evaluation and management of anxiety/irritability. Primary concerns are short fuse/irritability, and struggles with motivation/distrust of individuals. Trauma history suggest Complex PTSD as the primary etiology, with patient also meeting criteria for AMELIA and MDD.      Today patient is diong well. Is struggling with some worsening mood, will trial increased dosage of Zoloft     Plan:  #Complex PTSD  #AMELIA  #MDD, moderate, recurrent  - Increase Zoloft 200 mg to qday  - Continue  Wellbutrin to 150 mg qday for depression/motivation/energy  - Continue OTC Melatonin  - Continue therapy as scheduled     Risk Assessment for Suicide/Harm To Self/Others: : Based on patient history, demographics and today's interview, Patient is considered to be at low acute risk for self harm/harm to others, though baseline risk is increased compared to general population due to significant psychiatric comorbidity. Protective factors include denial of current SI, future orientation and engagement in treatment.      GOALS:  Short Term Goals: Patient will be compliant with medication, and patient will have no significant medication related side effects.  Patient will be engaged in psychotherapy as indicated.  Patient will report subjective improvement of symptoms.  Long term goals: To stabilize mood and treat/improve subjective symptoms, the patient will stay out of the hospital, the patient will be at an optimal level of functioning, and the patient will take all medications as prescribed.  The patient/guardian verbalized understanding and agreement with goals that were mutually set.      TREATMENT PLAN: Continue supportive psychotherapy efforts and medications as indicated.  Pharmacological and Non-Pharmacological treatment options discussed during today's visit. Patient/Guardian acknowledged and verbally consented with current treatment plan and was educated on the importance of compliance with treatment and follow-up appointments.       MEDICATION ISSUES:  Discussed medication options and treatment plan of prescribed medication as well as the risks, benefits, any black box warnings, and side effects including potential falls, possible impaired driving, and metabolic adversities among others. Patient is agreeable to call the office with any worsening of symptoms or onset of side effects, or if any concerns or questions arise.  The contact information for the office is made available to the patient. Patient is agreeable to call 911 or go to the nearest ER should they begin having any SI/HI, or if any urgent concerns arise. No medication side effects or related complaints today.     MEDS ORDERED DURING VISIT:  No orders of the defined types were placed in this encounter.      MEDS DISCONTINUED DURING VISIT:   There are no discontinued medications.     Follow Up Appointment:   6 weeks           This document has been electronically signed by Jose Alfredo Weaver MD  October 28, 2024 15:03 EDT

## 2024-11-26 ENCOUNTER — TELEMEDICINE (OUTPATIENT)
Dept: PSYCHIATRY | Facility: CLINIC | Age: 24
End: 2024-11-26
Payer: COMMERCIAL

## 2024-11-26 DIAGNOSIS — F43.12 CHRONIC POST-TRAUMATIC STRESS DISORDER (PTSD): ICD-10-CM

## 2024-11-26 DIAGNOSIS — F33.1 MODERATE EPISODE OF RECURRENT MAJOR DEPRESSIVE DISORDER: Primary | ICD-10-CM

## 2024-11-26 DIAGNOSIS — F41.1 GAD (GENERALIZED ANXIETY DISORDER): ICD-10-CM

## 2024-11-26 NOTE — PROGRESS NOTES
Date: 2024  Time In: 15:00 EST  Time out: 3:54 PM EST    This provider is located at [facility], [facility] [place], [place], Formerly named [facility], using a secure [employer] Video Visit through [employer]. Patient is being seen remotely via telehealth at their home address is located in [place]. Patient stated they are in a secure environment for this session. The patient's condition being diagnosed and treated is appropriate for telemedicine. The provider identified themself as well as their credentials. The patient, or  patient's legal guardian consent to be seen remotely, and when consent is given they understand that the consent allows for patient identifiable information to be sent to a third party as needed. They may refuse to be seen remotely at any time. The electronic data is encrypted and password protected, and the patient's or  legal guardian has been advised of the potential risks to privacy not withstanding such measures.   PT Identifiers used: Name and .    You have chosen to receive care through a telehealth visit.  Do you consent to use a video/audio connection for your medical care today? Yes    Subjective   Estela Santos is a 24 y.o. female who presents today for follow up    Chief Complaint:   Chief Complaint   Patient presents with    Anxiety    PTSD    Depression        Data:  Pt reported she is doing overall well. Pt  reported that work is going well, some generational differences with coworkers although has not been an issue. Pt reported spending Thanksgiving with her partners family. -excited about this. Pt reported she will be helping make some of the food. Pt reflected on the relationship with her partners mom -doesn't feel too close. Pt reflected on how she believes her relationship with her mom might effect the relationship with partners mom. Pt reflected on assumptions that she has. Pt reflected on hx of being raised in the [employer] and negative traits that were  instilled in her at a young age she no longer wants to exhibit.      Clinical Maneuvering/Intervention: Assisted patient in processing above session content; acknowledged and normalized patient’s thoughts, feelings, and concerns.  Rationalized patient thought process regarding concerns presented at session.  Discussed triggers associated with patient's  anxiety , depression , and PTSD Also discussed coping skills for patient to implement such as grounding , mindfulness , self care , positive self talk , and seeking clarity  communication    Allowed patient to freely discuss issues without interruption or judgment. Provided safe, confidential environment to facilitate the development of positive therapeutic relationship and encourage open, honest communication. Assisted patient in identifying risk factors which would indicate the need for higher level of care including thoughts to harm self or others and/or self-harming behavior and encouraged patient to contact this office, call 911, or present to the nearest emergency room should any of these events occur. Discussed crisis intervention services and means to access. Patient adamantly and convincingly denies current suicidal or homicidal ideation or perceptual disturbance.    Assessment: Pt was alert and oriented x3. Pt was located in a secure location for confidentiality/privacy. Pt appears to struggle with cognitive distortion emotional reasoning. Pt appeared receptive to implementing changes to aid with cognitive distortion. Pts MH appears to be improving overall through verbal reports. Pt appears to be aware of how her MH can effect her thought process and behaviors. Pt appears aware of how her [employer] trauma can effect certain traits she exhibits, working to make changes. Pt appeared receptive to techniques and encouragements discussed in session.    Patient appears to maintain relative stability as compared to their baseline.  However, patient continues to  struggle with   Chief Complaint   Patient presents with    Anxiety    PTSD    Depression    which continues to cause impairment in important areas of functioning.  A result, they can be reasonably expected to continue to benefit from treatment and would likely be at increased risk for decompensation otherwise.      Mental Status Exam:   Hygiene:   good  Cooperation:  Cooperative  Eye Contact:  Good  Psychomotor Behavior:  Appropriate  Affect:  Appropriate  Mood: normal  Speech:  Normal  Thought Process:  Linear  Thought Content:  Normal  Suicidal:  None  Homicidal:  None  Hallucinations:  None  Delusion:  None  Memory:  Intact  Orientation:  Person, Place, and Time  Reliability:  good  Insight:  Fair  Judgement:  Fair  Impulse Control:  Fair  Physical/Medical Issues:  No        Patient's Support Network Includes:  significant other and extended family    Functional Status: No impairment    Progress toward goal: Not at goal    Prognosis: Fair with Ongoing Treatment     Plan:     Patient will continue in individual outpatient therapy with focus on improved functioning and coping skills, maintaining stability, and avoiding decompensation and the need for higher level of care.    Patient will adhere to medication regimen as prescribed and report any side effects. Patient will contact this office, call 911 or present to the nearest emergency room should suicidal or homicidal ideations occur. Provide Cognitive Behavioral Therapy and Solution Focused Therapy to improve functioning, maintain stability, and avoid decompensation and the need for higher level of care.     Return in about 3 weeks (around 12/17/2024).      VISIT DIAGNOSIS:    Diagnosis Plan   1. Moderate episode of recurrent major depressive disorder        2. AMELIA (generalized anxiety disorder)        3. Chronic post-traumatic stress disorder (PTSD)         15:00 EST         This document has been electronically signed by Hortencia Manjarrez LCSW  November 26,  2024      Part of this note may be an electronic transcription/translation of spoken language to printed text using the Dragon Dictation System.

## 2024-12-08 ENCOUNTER — TELEPHONE (OUTPATIENT)
Dept: PSYCHIATRY | Facility: CLINIC | Age: 24
End: 2024-12-08
Payer: COMMERCIAL

## 2024-12-08 DIAGNOSIS — F33.1 MODERATE EPISODE OF RECURRENT MAJOR DEPRESSIVE DISORDER: ICD-10-CM

## 2024-12-08 DIAGNOSIS — F41.1 GAD (GENERALIZED ANXIETY DISORDER): ICD-10-CM

## 2024-12-08 RX ORDER — SERTRALINE HYDROCHLORIDE 100 MG/1
200 TABLET, FILM COATED ORAL DAILY
Qty: 30 TABLET | Refills: 1 | Status: CANCELLED | OUTPATIENT
Start: 2024-12-08

## 2024-12-10 ENCOUNTER — TELEMEDICINE (OUTPATIENT)
Dept: PSYCHIATRY | Facility: CLINIC | Age: 24
End: 2024-12-10
Payer: COMMERCIAL

## 2024-12-10 DIAGNOSIS — F33.1 MODERATE EPISODE OF RECURRENT MAJOR DEPRESSIVE DISORDER: Primary | ICD-10-CM

## 2024-12-10 DIAGNOSIS — F43.12 CHRONIC POST-TRAUMATIC STRESS DISORDER (PTSD): ICD-10-CM

## 2024-12-10 DIAGNOSIS — F41.1 GAD (GENERALIZED ANXIETY DISORDER): ICD-10-CM

## 2024-12-10 NOTE — PROGRESS NOTES
This provider is located at the [facility] ([facility]), [address], [place] [address], using a secure [employer] Video Visit through [employer]. Patient is being seen remotely via telehealth at their home address in [place], and stated they are in a secure environment for this session. The patient's condition being diagnosed/treated is appropriate for telemedicine. The provider identified herself as well as her credentials.  The patient, and/or patients guardian, consent to be seen remotely, and when consent is given they understand that the consent allows for patient identifiable information to be sent to a third party as needed.   They may refuse to be seen remotely at any time. The electronic data is encrypted and password protected, and the patient and/or guardian has been advised of the potential risks to privacy not withstanding such measures.    Mode of Visit: Video  Location of patient: -HOME-  Location of provider: +HOME+  You have chosen to receive care through a telehealth visit.  The patient has signed the video visit consent form.  The visit included audio and video interaction. No technical issues occurred during this visit.    Patient identifiers utilized: Name and date of birth.    Patient verbally confirmed consent for today's encounter:  December 10, 2024  Subjective     Estela Santos is a 24 y.o. female who presents today for follow up    Chief Complaint:    Chief Complaint   Patient presents with    Depression        History of Present Illness:    - Estela Santos is a 24 y.o. patient presenting for follow up of depression. At the previous visit, no changes were made as patient was stable   - Today, patient is doing well. She really enjoyed her thanksgiving, she spent time with her boyfriend's family.  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 She would rather be playing games.      Current Medications:  Wellbutrin  mg qday  Zoloft 200 mg qday    Side Effects: Denies  Sleep: Denies any new issues with sleep  Mood: \"Pretty good\"  SI/HI/AVH: Denies  Overall Function: Improved      The following portions of the patient's history were reviewed and updated as appropriate: allergies, current medications, past family history, past medical history, past social history, past surgical history and problem list.        Past Medical History:  Past Medical History:   Diagnosis Date    Anxiety 2016    Depression 2015    Hypertension 2016       Substance Abuse History:   Types:Denies all, including illicit  Withdrawal Symptoms:Denies  Longest Period Sober:Not Applicable   Interest In Treatment: N/A      Social History:  Social History     Socioeconomic History    Marital status: Single   Tobacco Use    Smoking status: Never     Passive exposure: Never    Smokeless tobacco: Never   Substance and Sexual Activity    Alcohol use: Never    Drug use: Never    Sexual activity: Yes     Partners: Male     Birth control/protection: Condom, Natural family planning/Rhythm       Family History:  Family History   Problem Relation Age of Onset    Depression Mother     Mental illness Mother     Thyroid disease Mother         Has Hashimoto    Hyperlipidemia Father     Thyroid disease Maternal Grandmother         Has MS and other autoimmune diseases    Diabetes Paternal Grandmother     Hyperlipidemia Paternal Grandmother     Alcohol abuse Maternal Uncle         Passed away due to heart attack from alcoholism    Alcohol abuse Maternal Uncle         Liver failure due to alcoholism    Thyroid disease Maternal Aunt         Has ms and other autoimmune diseases    Thyroid disease Maternal Aunt        Past Surgical History:  No past surgical history on file.    Problem List:  Patient Active Problem List "   Diagnosis    Essential hypertension    AMELIA (generalized anxiety disorder)    Moderate episode of recurrent major depressive disorder       Allergy:   No Known Allergies     Current Medications:   Current Outpatient Medications   Medication Sig Dispense Refill    buPROPion XL (Wellbutrin XL) 150 MG 24 hr tablet Take 1 tablet by mouth Every Morning for 90 days. 30 tablet 2    NIFEdipine XL (PROCARDIA XL) 60 MG 24 hr tablet TAKE 1 TABLET BY MOUTH DAILY 14 tablet 0    sertraline (ZOLOFT) 100 MG tablet Take 2 tablets by mouth Daily. 30 tablet 1     No current facility-administered medications for this visit.       Review of Symptoms:    Review of Systems   Psychiatric/Behavioral:  Negative for behavioral problems, sleep disturbance, suicidal ideas and depressed mood. The patient is not nervous/anxious.    All other systems reviewed and are negative.      Physical Exam:   Physical Exam  Constitutional:       Appearance: Normal appearance. She is not toxic-appearing.   Neurological:      Mental Status: She is alert.   Psychiatric:         Mood and Affect: Mood normal.         Behavior: Behavior normal.         Thought Content: Thought content normal.         Judgment: Judgment normal.         Vitals:  There were no vitals taken for this visit.   There is no height or weight on file to calculate BMI.    Last 3 Blood Pressure Readings:  BP Readings from Last 3 Encounters:   11/16/23 130/82   09/29/23 150/68   07/10/23 160/80       PHQ-9 Score:   PHQ-9 Total Score: (Patient-Rptd) 5    AMELIA-7 Score:   Feeling nervous, anxious or on edge: (Patient-Rptd) Not at all  Not being able to stop or control worrying: (Patient-Rptd) Not at all  Worrying too much about different things: (Patient-Rptd) Several days  Trouble Relaxing: (Patient-Rptd) Several days  Being so restless that it is hard to sit still: (Patient-Rptd) Not at all  Feeling afraid as if something awful might happen: (Patient-Rptd) Several days  Becoming easily annoyed  "or irritable: (Patient-Rptd) Several days  AMELIA 7 Total Score: (Patient-Rptd) 4  If you checked any problems, how difficult have these problems made it for you to do your work, take care of things at home, or get along with other people: (Patient-Rptd) Somewhat difficult     Mental Status Exam:   Hygiene:   good  Cooperation:  Cooperative  Eye Contact:  Good  Psychomotor Behavior:  Appropriate  Affect:  Full range  and Appropriate   Mood: \"Better, more energy\"  Hopelessness: Denies  Speech:  Normal  Thought Process:  Goal directed and Linear  Thought Content:  Normal  Suicidal:  None  Homicidal:  None  Hallucinations:  None  Delusion:  None  Memory:  Intact  Orientation:  Grossly intact  Reliability:  good  Insight:  Fair  Judgement:  Fair  Impulse Control:  Fair  Physical/Medical Issues:  Denies       Lab Results:   No visits with results within 3 Month(s) from this visit.   Latest known visit with results is:   Office Visit on 12/12/2022   Component Date Value Ref Range Status    WBC 12/29/2022 8.40  3.40 - 10.80 10*3/mm3 Final    RBC 12/29/2022 5.20  3.77 - 5.28 10*6/mm3 Final    Hemoglobin 12/29/2022 13.3  12.0 - 15.9 g/dL Final    Hematocrit 12/29/2022 41.7  34.0 - 46.6 % Final    MCV 12/29/2022 80.2  79.0 - 97.0 fL Final    MCH 12/29/2022 25.6 (L)  26.6 - 33.0 pg Final    MCHC 12/29/2022 31.9  31.5 - 35.7 g/dL Final    RDW 12/29/2022 14.9  12.3 - 15.4 % Final    Platelets 12/29/2022 347  140 - 450 10*3/mm3 Final    Glucose 12/29/2022 100 (H)  65 - 99 mg/dL Final    BUN 12/29/2022 7  6 - 20 mg/dL Final    Creatinine 12/29/2022 0.62  0.57 - 1.00 mg/dL Final    EGFR Result 12/29/2022 130.9  >60.0 mL/min/1.73 Final    Comment: National Kidney Foundation and American Society of  Nephrology (ASN) Task Force recommended calculation based  on the Chronic Kidney Disease Epidemiology Collaboration  (CKD-EPI) equation refit without adjustment for race.  GFR Normal >60  Chronic Kidney Disease <60  Kidney Failure <15      " BUN/Creatinine Ratio 12/29/2022 11.3  7.0 - 25.0 Final    Sodium 12/29/2022 138  136 - 145 mmol/L Final    Potassium 12/29/2022 4.1  3.5 - 5.2 mmol/L Final    Chloride 12/29/2022 106  98 - 107 mmol/L Final    Total CO2 12/29/2022 24.2  22.0 - 29.0 mmol/L Final    Calcium 12/29/2022 9.1  8.6 - 10.5 mg/dL Final    Total Protein 12/29/2022 7.2  6.0 - 8.5 g/dL Final    Albumin 12/29/2022 4.3  3.5 - 5.2 g/dL Final    Globulin 12/29/2022 2.9  gm/dL Final    A/G Ratio 12/29/2022 1.5  g/dL Final    Total Bilirubin 12/29/2022 0.3  0.0 - 1.2 mg/dL Final    Alkaline Phosphatase 12/29/2022 83  39 - 117 U/L Final    AST (SGOT) 12/29/2022 24  1 - 32 U/L Final    ALT (SGPT) 12/29/2022 33  1 - 33 U/L Final    Hemoglobin A1C 12/29/2022 5.40  4.80 - 5.60 % Final    Comment: Hemoglobin A1C Ranges:  Increased Risk for Diabetes  5.7% to 6.4%  Diabetes                     >= 6.5%  Diabetic Goal                < 7.0%      Hep C Virus Ab 12/29/2022 0.2  0.0 - 0.9 s/co ratio Final    Comment:                                   Negative:     < 0.8                               Indeterminate: 0.8 - 0.9                                    Positive:     > 0.9   HCV antibody alone does not differentiate between   previous resolved infection and active infection.   The CDC and current clinical guidelines recommend   that a positive HCV antibody result be followed up   with an HCV RNA test to support the diagnosis of   acute HCV infection. [facility] offers Hepatitis C   Virus (HCV) RNA, Diagnosis, GENEVIEVE (641240) and   Hepatitis C Virus (HCV) Antibody with reflex to   Quantitative Real-time PCR (598623).      Total Cholesterol 12/29/2022 145  0 - 200 mg/dL Final    Comment: Cholesterol Reference Ranges  (U.S. Department of Health and Human Services ATP III  Classifications)  Desirable          <200 mg/dL  Borderline High    200-239 mg/dL  High Risk          >240 mg/dL  Triglyceride Reference Ranges  (U.S. Department of Health and Human Services ATP  III  Classifications)  Normal           <150 mg/dL  Borderline High  150-199 mg/dL  High             200-499 mg/dL  Very High        >500 mg/dL  HDL Reference Ranges  (U.S. Department of Health and Human Services ATP III  Classifications)  Low     <40 mg/dl (major risk factor for CHD)  High    >60 mg/dl ('negative' risk factor for CHD)  LDL Reference Ranges  (U.S. Department of Health and Human Services ATP III  Classifications)  Optimal          <100 mg/dL  Near Optimal     100-129 mg/dL  Borderline High  130-159 mg/dL  High             160-189 mg/dL  Very High        >189 mg/dL      Triglycerides 12/29/2022 115  0 - 150 mg/dL Final    HDL Cholesterol 12/29/2022 29 (L)  40 - 60 mg/dL Final    VLDL Cholesterol Ray 12/29/2022 21  5 - 40 mg/dL Final    LDL Chol Calc (NIH) 12/29/2022 95  0 - 100 mg/dL Final    Chol/HDL Ratio 12/29/2022 5.00   Final    TSH 12/29/2022 2.940  0.270 - 4.200 uIU/mL Final    Free T4 12/29/2022 1.10  0.93 - 1.70 ng/dL Final    Results may be falsely increased if patient taking Biotin.    Thyroid Peroxidase Antibody 12/29/2022 9  0 - 34 IU/mL Final    Thyroglobulin Ab 12/29/2022 <1.0  0.0 - 0.9 IU/mL Final    Thyroglobulin Antibody measured by Shania Treynor Methodology    Vitamin B-12 12/29/2022 272  211 - 946 pg/mL Final    Results may be falsely increased if patient taking Biotin.    25 Hydroxy, Vitamin D 12/29/2022 12.9 (L)  30.0 - 100.0 ng/ml Final    Comment: Results may be falsely increased if patient taking Biotin.  Reference Range for Total Vitamin D 25(OH)  Deficiency <20.0 ng/mL  Insufficiency 21-29 ng/mL  Sufficiency  ng/mL  Toxicity >100 ng/ml           Assessment & Plan   Diagnoses and all orders for this visit:    1. Moderate episode of recurrent major depressive disorder (Primary)    2. AMELIA (generalized anxiety disorder)    3. Chronic post-traumatic stress disorder (PTSD)        Visit Diagnoses:    ICD-10-CM ICD-9-CM   1. Moderate episode of recurrent major depressive  disorder  F33.1 296.32   2. AMELIA (generalized anxiety disorder)  F41.1 300.02   3. Chronic post-traumatic stress disorder (PTSD)  F43.12 309.81       Formulation:  24 yo with major trauma history presenting for follow up evaluation and management of anxiety/irritability. Primary concerns are short fuse/irritability, and struggles with motivation/distrust of individuals. Trauma history suggest Complex PTSD as the primary etiology, with patient also meeting criteria for AMLEIA and MDD.      Today patient is doing well. Motivation has improved. After discussion, will hold steady on current dosage and reassess in 2 months.      Plan:  #Complex PTSD  #AMELIA  #MDD, moderate, recurrent  - Continue Zoloft 200 mg to qday  - Continue  Wellbutrin to 150 mg qday for depression/motivation/energy  - Continue OTC Melatonin  - Continue therapy as scheduled  - Patient to look into light therapy lamp for the winter season.      Risk Assessment for Suicide/Harm To Self/Others: : Based on patient history, demographics and today's interview, Patient is considered to be at low acute risk for self harm/harm to others, though baseline risk is increased compared to general population due to significant psychiatric comorbidity. Protective factors include denial of current SI, future orientation and engagement in treatment.      GOALS:  Short Term Goals: Patient will be compliant with medication, and patient will have no significant medication related side effects.  Patient will be engaged in psychotherapy as indicated.  Patient will report subjective improvement of symptoms.  Long term goals: To stabilize mood and treat/improve subjective symptoms, the patient will stay out of the hospital, the patient will be at an optimal level of functioning, and the patient will take all medications as prescribed.  The patient/guardian verbalized understanding and agreement with goals that were mutually set.         TREATMENT PLAN: Continue supportive  psychotherapy efforts and medications as indicated.  Pharmacological and Non-Pharmacological treatment options discussed during today's visit. Patient/Guardian acknowledged and verbally consented with current treatment plan and was educated on the importance of compliance with treatment and follow-up appointments.      MEDICATION ISSUES:  Discussed medication options and treatment plan of prescribed medication as well as the risks, benefits, any black box warnings, and side effects including potential falls, possible impaired driving, and metabolic adversities among others. Patient is agreeable to call the office with any worsening of symptoms or onset of side effects, or if any concerns or questions arise.  The contact information for the office is made available to the patient. Patient is agreeable to call 911 or go to the nearest ER should they begin having any SI/HI, or if any urgent concerns arise. No medication side effects or related complaints today.     MEDS ORDERED DURING VISIT:  No orders of the defined types were placed in this encounter.      MEDS DISCONTINUED DURING VISIT:   There are no discontinued medications.     Follow Up Appointment:   3 months           This document has been electronically signed by Jose Alfredo Weaver MD  December 10, 2024 14:12 EST

## 2024-12-30 DIAGNOSIS — F33.1 MODERATE EPISODE OF RECURRENT MAJOR DEPRESSIVE DISORDER: ICD-10-CM

## 2024-12-30 DIAGNOSIS — F41.1 GAD (GENERALIZED ANXIETY DISORDER): ICD-10-CM

## 2024-12-30 RX ORDER — SERTRALINE HYDROCHLORIDE 100 MG/1
200 TABLET, FILM COATED ORAL DAILY
Qty: 30 TABLET | Refills: 1 | Status: SHIPPED | OUTPATIENT
Start: 2024-12-30

## 2025-01-21 ENCOUNTER — TELEMEDICINE (OUTPATIENT)
Dept: PSYCHIATRY | Facility: CLINIC | Age: 25
End: 2025-01-21
Payer: COMMERCIAL

## 2025-01-21 DIAGNOSIS — F41.1 GAD (GENERALIZED ANXIETY DISORDER): ICD-10-CM

## 2025-01-21 DIAGNOSIS — F43.12 CHRONIC POST-TRAUMATIC STRESS DISORDER (PTSD): ICD-10-CM

## 2025-01-21 DIAGNOSIS — F33.1 MODERATE EPISODE OF RECURRENT MAJOR DEPRESSIVE DISORDER: Primary | ICD-10-CM

## 2025-01-21 RX ORDER — BUSPIRONE HYDROCHLORIDE 10 MG/1
10 TABLET ORAL 2 TIMES DAILY
Qty: 60 TABLET | Refills: 2 | Status: SHIPPED | OUTPATIENT
Start: 2025-01-21

## 2025-01-28 ENCOUNTER — TELEMEDICINE (OUTPATIENT)
Dept: PSYCHIATRY | Facility: CLINIC | Age: 25
End: 2025-01-28
Payer: COMMERCIAL

## 2025-01-28 DIAGNOSIS — F33.1 MODERATE EPISODE OF RECURRENT MAJOR DEPRESSIVE DISORDER: Primary | ICD-10-CM

## 2025-01-28 DIAGNOSIS — F43.12 CHRONIC POST-TRAUMATIC STRESS DISORDER (PTSD): ICD-10-CM

## 2025-01-28 DIAGNOSIS — F41.1 GAD (GENERALIZED ANXIETY DISORDER): ICD-10-CM

## 2025-01-28 NOTE — TREATMENT PLAN
Multi-Disciplinary Problems (from Behavioral Health Treatment Plan)      Active Problems       Problem: Anxiety  Start Date: 01/28/25      Problem Details: The patient self-scales this problem as a 8 with 10 being the worst.          Goal Priority Start Date Expected End Date End Date    Patient will develop and implement behavioral and cognitive strategies to reduce anxiety and irrational fears. Low 01/28/25 07/29/25 --    Goal Details: Progress toward goal:  The patient self-scales their progress related to this goal as a 8 with 10 being the worst.          Goal Intervention Frequency Start Date End Date    Help patient explore past emotional issues in relation to present anxiety. PRN 01/28/25 --    Intervention Details: Duration of treatment until discharged.          Goal Intervention Frequency Start Date End Date    Help patient develop an awareness of their cognitive and physical responses to anxiety. PRN 01/28/25 --    Intervention Details: Duration of treatment until discharged.  Pt is able to identify contributing factors to anxiety. Pt can become overwhelmed with situations that can increase anxiety symptoms -progressing     Aid pt in prepping for triggers of anxiety to minimize reactions to more predictable situations.                 Problem: Depression  Start Date: 01/28/25      Problem Details: The patient self-scales this problem as a 8 with 10 being the worst.          Goal Priority Start Date Expected End Date End Date    Patient will demonstrate the ability to initiate new constructive life skills outside of sessions on a consistent basis. Low 01/28/25 07/29/25 --    Goal Details: Progress toward goal:  The patient self-scales their progress related to this goal as a 8 with 10 being the worst.          Goal Intervention Frequency Start Date End Date    Assist patient in setting attainable activities of daily living goals. PRN 01/28/25 --    Intervention Details: Clinician will aid pt in increasing  structure, routine, focusing accomplishments instead of perceived failures.     Clinician will aid pt with implementing coping skills or techniques that aid with increasing natural dopamine, serotonin and endorphins.          Goal Intervention Frequency Start Date End Date    Provide education about depression PRN 01/28/25 --    Intervention Details: Duration of treatment until discharged.          Goal Intervention Frequency Start Date End Date    Assist patient in developing healthy coping strategies. PRN 01/28/25 --    Intervention Details: Duration of treatment until discharged.                  Problem: Post Traumatic Stress  Start Date: 01/28/25      Problem Details: The patient self-scales this problem as a 3 with 10 being the worst.          Goal Priority Start Date Expected End Date End Date    Patient will process and move through trauma in a way that improves self regard and the patients ability to function optimally in the world around them. Low 01/28/25 07/29/25 --    Goal Details: Progress toward goal:  The patient self-scales their progress related to this goal as a 3 with 10 being the worst.          Goal Intervention Frequency Start Date End Date    Assist patient in identifying ways that trauma has negatively impacted their view of themselves and the world. PRN 01/28/25 --    Intervention Details: Duration of treatment until discharged.          Goal Intervention Frequency Start Date End Date    Process trauma in the context of the safe session environment. PRN 01/28/25 --    Intervention Details: Duration of treatment until discharged.          Goal Intervention Frequency Start Date End Date    Develop a plan of behavior changes that will reduce the stress of the trauma. PRN 01/28/25 --    Intervention Details: Duration of treatment until discharged.    Aid pt in identifying triggers stemming from traumatic experiences. Aid pt in prepping for predictable triggers to aid with reaction.                           Reviewed By       Hortencia Manjarrez LCSW 01/28/25 1521                     I have discussed and reviewed this treatment plan with the patient.

## 2025-01-28 NOTE — PROGRESS NOTES
Date: 2025  Time In: 14:32 EST  Time out: 3:28 PM EST    This provider is located at [facility], [facility] [address], [place], [facility], using a secure [employer] Video Visit through [employer]. Patient is being seen remotely via telehealth at their home address is located in [place]. Patient stated they are in a secure environment for this session. The patient's condition being diagnosed and treated is appropriate for telemedicine. The provider identified themself as well as their credentials. The patient, or  patient's legal guardian consent to be seen remotely, and when consent is given they understand that the consent allows for patient identifiable information to be sent to a third party as needed. They may refuse to be seen remotely at any time. The electronic data is encrypted and password protected, and the patient's or  legal guardian has been advised of the potential risks to privacy not withstanding such measures.   PT Identifiers used: Name and .    You have chosen to receive care through a telehealth visit.  Do you consent to use a video/audio connection for your medical care today? Yes    Subjective   Estela Santos is a 24 y.o. female who presents today for follow up    Chief Complaint:   Chief Complaint   Patient presents with    Anxiety    Depression    PTSD        Data: Pt reported that she went on a cruise for her  break. Pt reported that she has started back to work after many snow days, getting adjusted to the routine again. Pt reported that she has an appointment get assessed for an autoimmune disease. Pt reported that she has struggled with anxiety re this possibility. Pt reported anxiety with the political news. Pt reflected on conflict with her mother and unhealthy patterns. Pt reported she has started expressing how she feels.      Clinical Maneuvering/Intervention: Assisted patient in processing above session content; acknowledged and normalized  patient’s thoughts, feelings, and concerns.  Rationalized patient thought process regarding concerns presented at session.  Discussed triggers associated with patient's  anxiety , depression , and PTSD Also discussed coping skills for patient to implement such as grounding , mindfulness , self care , positive self talk , and advocating for self    Allowed patient to freely discuss issues without interruption or judgment. Provided safe, confidential environment to facilitate the development of positive therapeutic relationship and encourage open, honest communication. Assisted patient in identifying risk factors which would indicate the need for higher level of care including thoughts to harm self or others and/or self-harming behavior and encouraged patient to contact this office, call 911, or present to the nearest emergency room should any of these events occur. Discussed crisis intervention services and means to access. Patient adamantly and convincingly denies current suicidal or homicidal ideation or perceptual disturbance.    Assessment: Pt was alert and oriented x3.  Pt appears open and honest re MH symptoms that have affected life in a negative way. Pt appears motivated to improve MH symptoms and overall quality of life. Pt appears to struggle with cognitive distortions of mind reading/assuming, filtering. Pt appears to need space to process situations, pt is able to remove herself in a healthy manner to recenter. Pts relationship with her mother appears to be unhealthy. Pt rated MH symptoms on a scale 1-10 (10 being the worst). Pt rated anxiety as 8/10, depression 8/10 and PTSD 3/10.     Patient appears to maintain relative stability as compared to their baseline.  However, patient continues to struggle with   Chief Complaint   Patient presents with    Anxiety    Depression    PTSD    which continues to cause impairment in important areas of functioning.  A result, they can be reasonably expected to  continue to benefit from treatment and would likely be at increased risk for decompensation otherwise.      Mental Status Exam:   Hygiene:   good  Cooperation:  Cooperative  Eye Contact:  Good  Psychomotor Behavior:  Appropriate  Affect:  Appropriate  Mood: normal  Speech:  Normal  Thought Process:  Linear  Thought Content:  Normal  Suicidal:  None  Homicidal:  None  Hallucinations:  None  Delusion:  None  Memory:  Intact  Orientation:  Person, Place, and Time  Reliability:  fair  Insight:  Fair  Judgement:  Fair  Impulse Control:  Fair  Physical/Medical Issues:  No        Patient's Support Network Includes:  significant other    Functional Status: No impairment    Progress toward goal: Not at goal    Prognosis: Fair with Ongoing Treatment     Plan:     Patient will continue in individual outpatient therapy with focus on improved functioning and coping skills, maintaining stability, and avoiding decompensation and the need for higher level of care.    Patient will adhere to medication regimen as prescribed and report any side effects. Patient will contact this office, call 911 or present to the nearest emergency room should suicidal or homicidal ideations occur. Provide Cognitive Behavioral Therapy and Solution Focused Therapy to improve functioning, maintain stability, and avoid decompensation and the need for higher level of care.     Return in about 8 weeks (around 3/25/2025).      VISIT DIAGNOSIS:    Diagnosis Plan   1. Moderate episode of recurrent major depressive disorder        2. AMELIA (generalized anxiety disorder)        3. Chronic post-traumatic stress disorder (PTSD)         14:32 EST         This document has been electronically signed by Hortencia Manjarrez LCSW  January 28, 2025      Part of this note may be an electronic transcription/translation of spoken language to printed text using the Dragon Dictation System.

## 2025-01-28 NOTE — PLAN OF CARE
Discussed tx plan with patient. Tx plan was not printed due to meeting virtually. Pt was able to identify triggers relatively well. Pt has coping skills that could be implemented more in moments of increased emotions. Pt reported an understanding of how MH symptoms can affect behaviors/thought process. Pt may benefit from more frequent sessions, although due to schedule/availability this is unattainable at this time. Current life stressors include; finances/bills, politics, relationship with mom.    Coping skills: coloring, audio books, organizing

## 2025-02-01 DIAGNOSIS — F41.1 GAD (GENERALIZED ANXIETY DISORDER): ICD-10-CM

## 2025-02-01 DIAGNOSIS — F33.1 MODERATE EPISODE OF RECURRENT MAJOR DEPRESSIVE DISORDER: ICD-10-CM

## 2025-02-03 RX ORDER — SERTRALINE HYDROCHLORIDE 100 MG/1
200 TABLET, FILM COATED ORAL DAILY
Qty: 30 TABLET | Refills: 1 | Status: SHIPPED | OUTPATIENT
Start: 2025-02-03

## 2025-02-03 RX ORDER — BUPROPION HYDROCHLORIDE 150 MG/1
150 TABLET ORAL EVERY MORNING
Qty: 30 TABLET | Refills: 2 | Status: SHIPPED | OUTPATIENT
Start: 2025-02-03 | End: 2025-05-04

## 2025-02-15 DIAGNOSIS — F41.1 GAD (GENERALIZED ANXIETY DISORDER): ICD-10-CM

## 2025-02-15 DIAGNOSIS — F33.1 MODERATE EPISODE OF RECURRENT MAJOR DEPRESSIVE DISORDER: ICD-10-CM

## 2025-02-17 RX ORDER — BUPROPION HYDROCHLORIDE 150 MG/1
150 TABLET ORAL EVERY MORNING
Qty: 30 TABLET | Refills: 2 | Status: SHIPPED | OUTPATIENT
Start: 2025-02-17 | End: 2025-05-18

## 2025-02-17 RX ORDER — SERTRALINE HYDROCHLORIDE 100 MG/1
200 TABLET, FILM COATED ORAL DAILY
Qty: 60 TABLET | Refills: 2 | Status: SHIPPED | OUTPATIENT
Start: 2025-02-17

## 2025-03-05 ENCOUNTER — TELEMEDICINE (OUTPATIENT)
Dept: PSYCHIATRY | Facility: CLINIC | Age: 25
End: 2025-03-05
Payer: COMMERCIAL

## 2025-03-05 DIAGNOSIS — F41.1 GAD (GENERALIZED ANXIETY DISORDER): ICD-10-CM

## 2025-03-05 DIAGNOSIS — F33.1 MODERATE EPISODE OF RECURRENT MAJOR DEPRESSIVE DISORDER: Primary | ICD-10-CM

## 2025-03-05 DIAGNOSIS — F43.12 CHRONIC POST-TRAUMATIC STRESS DISORDER (PTSD): ICD-10-CM

## 2025-03-05 NOTE — PROGRESS NOTES
[facility] (through [facility]) is located [address] [place] [address]. This provider is located at home office, accessing appointment using a secure Digitingt Video Visit through My[employer]. Patient stated they are in a secure environment for this session. The patient's condition being diagnosed/treated is appropriate for telemedicine. The provider identified himself as well as his credentials.  The patient, and/or patients guardian, consent to be seen remotely, and when consent is given they understand that the consent allows for patient identifiable information to be sent to a third party as needed.   They may refuse to be seen remotely at any time. The electronic data is encrypted and password protected, and the patient and/or guardian has been advised of the potential risks to privacy not withstanding such measures.    Mode of Visit: Video  Location of patient: -HOME-  Location of provider: +HOME+  You have chosen to receive care through a telehealth visit.  The patient has signed the video visit consent form.  The visit included audio and video interaction. No technical issues occurred during this visit.    Patient identifiers utilized: Name and date of birth.    Patient verbally confirmed consent for today's encounter:  March 5, 2025  Subjective     Estela Santos is a 24 y.o. female who presents today for follow up    Chief Complaint:    Chief Complaint   Patient presents with    Anxiety        History of Present Illness:    - Estela Santos is a 24 y.o. patient presenting for follow up of anxiety, depression. At the previous visit, Buspar was added  - Overall patient is doing fairly well. Has been working pretty consistently.   - Feels that the buspar has helped out quite a bit. Feels like her anxiety is much improved. Gives example of not struggling as much with going to work, social anxiety, etc.   - Mood is 'okay', getting into a good routine with work.  Enjoys her work, they had Dr Wai luna this last week. 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      Current Medications:  Wellbutrin  mg qday  Zoloft 200 mg qday  Buspar 10 mg BID    Side Effects: Denies any side effects with the buspar  Sleep:No acute concerns  Mood: 'okay'  SI/HI/AVH: Denies  Overall Function: Stable      The following portions of the patient's history were reviewed and updated as appropriate: allergies, current medications, past family history, past medical history, past social history, past surgical history and problem list.        Past Medical History:  Past Medical History:   Diagnosis Date    Anxiety 2016    Depression 2015    Hypertension 2016       Substance Abuse History:   Types:Denies all, including illicit  Withdrawal Symptoms:Denies  Longest Period Sober:Not Applicable   Interest In Treatment: N/A      Social History:  Social History     Socioeconomic History    Marital status: Single   Tobacco Use    Smoking status: Never     Passive exposure: Never    Smokeless tobacco: Never   Substance and Sexual Activity    Alcohol use: Never    Drug use: Never    Sexual activity: Yes     Partners: Male     Birth control/protection: Condom, Natural family planning/Rhythm       Family History:  Family History   Problem Relation Age of Onset    Depression Mother     Mental illness Mother     Thyroid disease Mother         Has Hashimoto    Hyperlipidemia Father     Thyroid disease Maternal Grandmother         Has MS and other autoimmune diseases    Diabetes Paternal Grandmother     Hyperlipidemia Paternal Grandmother     Alcohol abuse Maternal Uncle         Passed away due to heart attack from alcoholism    Alcohol abuse Maternal Uncle         Liver failure due to alcoholism    Thyroid disease Maternal Aunt         Has ms and other autoimmune diseases    Thyroid disease Maternal Aunt        Past Surgical History:  History reviewed. No pertinent surgical  history.    Problem List:  Patient Active Problem List   Diagnosis    Essential hypertension    AMELIA (generalized anxiety disorder)    Moderate episode of recurrent major depressive disorder       Allergy:   No Known Allergies     Current Medications:   Current Outpatient Medications   Medication Sig Dispense Refill    buPROPion XL (Wellbutrin XL) 150 MG 24 hr tablet Take 1 tablet by mouth Every Morning for 90 days. 30 tablet 2    busPIRone (BUSPAR) 10 MG tablet Take 1 tablet by mouth 2 (Two) Times a Day. 60 tablet 2    NIFEdipine XL (PROCARDIA XL) 60 MG 24 hr tablet TAKE 1 TABLET BY MOUTH DAILY 14 tablet 0    sertraline (ZOLOFT) 100 MG tablet Take 2 tablets by mouth Daily. 60 tablet 2     No current facility-administered medications for this visit.       Review of Symptoms:    Review of Systems   Psychiatric/Behavioral:  Negative for behavioral problems, sleep disturbance, suicidal ideas and depressed mood. The patient is not nervous/anxious.    All other systems reviewed and are negative.      Physical Exam:   Physical Exam  Constitutional:       Appearance: Normal appearance. She is not toxic-appearing.   Neurological:      Mental Status: She is alert.   Psychiatric:         Mood and Affect: Mood normal.         Behavior: Behavior normal.         Thought Content: Thought content normal.         Judgment: Judgment normal.         Vitals:  There were no vitals taken for this visit.   There is no height or weight on file to calculate BMI.    Last 3 Blood Pressure Readings:  BP Readings from Last 3 Encounters:   11/16/23 130/82   09/29/23 150/68   07/10/23 160/80       PHQ-9 Score:   PHQ-9 Total Score: (Patient-Rptd) 4    AMELIA-7 Score:   Feeling nervous, anxious or on edge: (Patient-Rptd) Not at all  Worrying too much about different things: (Patient-Rptd) Not at all  Trouble Relaxing: (Patient-Rptd) Not at all  Being so restless that it is hard to sit still: (Patient-Rptd) Several days  Feeling afraid as if something  awful might happen: (Patient-Rptd) Several days  Becoming easily annoyed or irritable: (Patient-Rptd) Several days  AMELIA 7 Total Score: (Patient-Rptd) 3  If you checked any problems, how difficult have these problems made it for you to do your work, take care of things at home, or get along with other people: (Patient-Rptd) Somewhat difficult     Mental Status Exam:   Hygiene:   good  Cooperation:  Cooperative  Eye Contact:  Good  Psychomotor Behavior:  Appropriate  Affect:  Full range  and Appropriate   Mood: euthymic  Hopelessness: Denies  Speech:  Normal  Thought Process:  Goal directed and Linear  Thought Content:  Normal  Suicidal:  None  Homicidal:  None  Hallucinations:  None  Delusion:  None  Memory:  Intact  Orientation:  Grossly intact  Reliability:  good  Insight:  Fair  Judgement:  Fair  Impulse Control:  Fair  Physical/Medical Issues:  Denies       Lab Results:   No visits with results within 3 Month(s) from this visit.   Latest known visit with results is:   Office Visit on 12/12/2022   Component Date Value Ref Range Status    WBC 12/29/2022 8.40  3.40 - 10.80 10*3/mm3 Final    RBC 12/29/2022 5.20  3.77 - 5.28 10*6/mm3 Final    Hemoglobin 12/29/2022 13.3  12.0 - 15.9 g/dL Final    Hematocrit 12/29/2022 41.7  34.0 - 46.6 % Final    MCV 12/29/2022 80.2  79.0 - 97.0 fL Final    MCH 12/29/2022 25.6 (L)  26.6 - 33.0 pg Final    MCHC 12/29/2022 31.9  31.5 - 35.7 g/dL Final    RDW 12/29/2022 14.9  12.3 - 15.4 % Final    Platelets 12/29/2022 347  140 - 450 10*3/mm3 Final    Glucose 12/29/2022 100 (H)  65 - 99 mg/dL Final    BUN 12/29/2022 7  6 - 20 mg/dL Final    Creatinine 12/29/2022 0.62  0.57 - 1.00 mg/dL Final    EGFR Result 12/29/2022 130.9  >60.0 mL/min/1.73 Final    Comment: National Kidney Foundation and American Society of  Nephrology (ASN) Task Force recommended calculation based  on the Chronic Kidney Disease Epidemiology Collaboration  (CKD-EPI) equation refit without adjustment for race.  GFR  Normal >60  Chronic Kidney Disease <60  Kidney Failure <15      BUN/Creatinine Ratio 12/29/2022 11.3  7.0 - 25.0 Final    Sodium 12/29/2022 138  136 - 145 mmol/L Final    Potassium 12/29/2022 4.1  3.5 - 5.2 mmol/L Final    Chloride 12/29/2022 106  98 - 107 mmol/L Final    Total CO2 12/29/2022 24.2  22.0 - 29.0 mmol/L Final    Calcium 12/29/2022 9.1  8.6 - 10.5 mg/dL Final    Total Protein 12/29/2022 7.2  6.0 - 8.5 g/dL Final    Albumin 12/29/2022 4.3  3.5 - 5.2 g/dL Final    Globulin 12/29/2022 2.9  gm/dL Final    A/G Ratio 12/29/2022 1.5  g/dL Final    Total Bilirubin 12/29/2022 0.3  0.0 - 1.2 mg/dL Final    Alkaline Phosphatase 12/29/2022 83  39 - 117 U/L Final    AST (SGOT) 12/29/2022 24  1 - 32 U/L Final    ALT (SGPT) 12/29/2022 33  1 - 33 U/L Final    Hemoglobin A1C 12/29/2022 5.40  4.80 - 5.60 % Final    Comment: Hemoglobin A1C Ranges:  Increased Risk for Diabetes  5.7% to 6.4%  Diabetes                     >= 6.5%  Diabetic Goal                < 7.0%      Hep C Virus Ab 12/29/2022 0.2  0.0 - 0.9 s/co ratio Final    Comment:                                   Negative:     < 0.8                               Indeterminate: 0.8 - 0.9                                    Positive:     > 0.9   HCV antibody alone does not differentiate between   previous resolved infection and active infection.   The CDC and current clinical guidelines recommend   that a positive HCV antibody result be followed up   with an HCV RNA test to support the diagnosis of   acute HCV infection. [facility] offers Hepatitis C   Virus (HCV) RNA, Diagnosis, GENEVIEVE (983573) and   Hepatitis C Virus (HCV) Antibody with reflex to   Quantitative Real-time PCR ([id number]).      Total Cholesterol 12/29/2022 145  0 - 200 mg/dL Final    Comment: Cholesterol Reference Ranges  (U.S. Department of Health and Human Services ATP III  Classifications)  Desirable          <200 mg/dL  Borderline High    200-239 mg/dL  High Risk          >240 mg/dL  Triglyceride Reference  Ranges  (U.S. Department of Health and Human Services ATP III  Classifications)  Normal           <150 mg/dL  Borderline High  150-199 mg/dL  High             200-499 mg/dL  Very High        >500 mg/dL  HDL Reference Ranges  (U.S. Department of Health and Human Services ATP III  Classifications)  Low     <40 mg/dl (major risk factor for CHD)  High    >60 mg/dl ('negative' risk factor for CHD)  LDL Reference Ranges  (U.S. Department of Health and Human Services ATP III  Classifications)  Optimal          <100 mg/dL  Near Optimal     100-129 mg/dL  Borderline High  130-159 mg/dL  High             160-189 mg/dL  Very High        >189 mg/dL      Triglycerides 12/29/2022 115  0 - 150 mg/dL Final    HDL Cholesterol 12/29/2022 29 (L)  40 - 60 mg/dL Final    VLDL Cholesterol Ray 12/29/2022 21  5 - 40 mg/dL Final    LDL Chol Calc (NIH) 12/29/2022 95  0 - 100 mg/dL Final    Chol/HDL Ratio 12/29/2022 5.00   Final    TSH 12/29/2022 2.940  0.270 - 4.200 uIU/mL Final    Free T4 12/29/2022 1.10  0.93 - 1.70 ng/dL Final    Results may be falsely increased if patient taking Biotin.    Thyroid Peroxidase Antibody 12/29/2022 9  0 - 34 IU/mL Final    Thyroglobulin Ab 12/29/2022 <1.0  0.0 - 0.9 IU/mL Final    Thyroglobulin Antibody measured by [employer]    Vitamin B-12 12/29/2022 272  211 - 946 pg/mL Final    Results may be falsely increased if patient taking Biotin.    25 Hydroxy, Vitamin D 12/29/2022 12.9 (L)  30.0 - 100.0 ng/ml Final    Comment: Results may be falsely increased if patient taking Biotin.  Reference Range for Total Vitamin D 25(OH)  Deficiency <20.0 ng/mL  Insufficiency 21-29 ng/mL  Sufficiency  ng/mL  Toxicity >100 ng/ml           Assessment & Plan   Diagnoses and all orders for this visit:    1. Moderate episode of recurrent major depressive disorder (Primary)    2. AMELIA (generalized anxiety disorder)    3. Chronic post-traumatic stress disorder (PTSD)          Visit Diagnoses:    ICD-10-CM  ICD-9-CM   1. Moderate episode of recurrent major depressive disorder  F33.1 296.32   2. AMELIA (generalized anxiety disorder)  F41.1 300.02   3. Chronic post-traumatic stress disorder (PTSD)  F43.12 309.81         Formulation:  25 yo with major trauma history presenting for follow up evaluation and management of anxiety/irritability. Primary concerns are short fuse/irritability, and struggles with motivation/distrust of individuals. Trauma history suggest Complex PTSD as the primary etiology, with patient also meeting criteria for AMELIA and MDD.      3/5/2025: Overall patient is doing really well, has had a robust response to Buspar. No changes today          Plan:  #Complex PTSD  #AMELIA  #MDD, moderate, recurrent  - Continue Zoloft 200 mg to qday  - Continue  Wellbutrin to 150 mg qday for depression/motivation/energy  - Continue Buspar 10 mg BID  - Continue OTC Melatonin  - Continue therapy as scheduled  - Patient to look into light therapy lamp for the winter season.      Risk Assessment for Suicide/Harm To Self/Others: : Based on patient history, demographics and today's interview, Patient is considered to be at low acute risk for self harm/harm to others, though baseline risk is increased compared to general population due to significant psychiatric comorbidity. Protective factors include denial of current SI, future orientation and engagement in treatment.      GOALS:  Short Term Goals: Patient will be compliant with medication, and patient will have no significant medication related side effects.  Patient will be engaged in psychotherapy as indicated.  Patient will report subjective improvement of symptoms.  Long term goals: To stabilize mood and treat/improve subjective symptoms, the patient will stay out of the hospital, the patient will be at an optimal level of functioning, and the patient will take all medications as prescribed.  The patient/guardian verbalized understanding and agreement with goals that were  mutually set.         TREATMENT PLAN: Continue supportive psychotherapy efforts and medications as indicated.  Pharmacological and Non-Pharmacological treatment options discussed during today's visit. Patient/Guardian acknowledged and verbally consented with current treatment plan and was educated on the importance of compliance with treatment and follow-up appointments.      MEDICATION ISSUES:  Discussed medication options and treatment plan of prescribed medication as well as the risks, benefits, any black box warnings, and side effects including potential falls, possible impaired driving, and metabolic adversities among others. Patient is agreeable to call the office with any worsening of symptoms or onset of side effects, or if any concerns or questions arise.  The contact information for the office is made available to the patient. Patient is agreeable to call 911 or go to the nearest ER should they begin having any SI/HI, or if any urgent concerns arise. No medication side effects or related complaints today.     MEDS ORDERED DURING VISIT:  No orders of the defined types were placed in this encounter.      MEDS DISCONTINUED DURING VISIT:   There are no discontinued medications.     Follow Up Appointment:   2 months           This document has been electronically signed by Jose Alfredo Weaver MD  March 5, 2025 15:19 EST

## 2025-03-18 ENCOUNTER — TELEMEDICINE (OUTPATIENT)
Dept: PSYCHIATRY | Facility: CLINIC | Age: 25
End: 2025-03-18
Payer: COMMERCIAL

## 2025-03-18 DIAGNOSIS — F43.12 CHRONIC POST-TRAUMATIC STRESS DISORDER (PTSD): ICD-10-CM

## 2025-03-18 DIAGNOSIS — F33.1 MODERATE EPISODE OF RECURRENT MAJOR DEPRESSIVE DISORDER: Primary | ICD-10-CM

## 2025-03-18 DIAGNOSIS — F41.1 GAD (GENERALIZED ANXIETY DISORDER): ICD-10-CM

## 2025-03-18 NOTE — PROGRESS NOTES
Date: 2025  Time In: 15:32 EDT  Time out: 4:26 PM EST    This provider is located at [facility], [facility] [place], [place], [facility], using a secure Fooducatehart Video Visit through [employer]. Patient is being seen remotely via telehealth at their home address is located in [place]. Patient stated they are in a secure environment for this session. The patient's condition being diagnosed and treated is appropriate for telemedicine. The provider identified themself as well as their credentials. The patient, or  patient's legal guardian consent to be seen remotely, and when consent is given they understand that the consent allows for patient identifiable information to be sent to a third party as needed. They may refuse to be seen remotely at any time. The electronic data is encrypted and password protected, and the patient's or  legal guardian has been advised of the potential risks to privacy not withstanding such measures.   PT Identifiers used: Name and .    You have chosen to receive care through a telehealth visit.  Do you consent to use a video/audio connection for your medical care today? Yes    Subjective   Esetla Santos is a 24 y.o. female who presents today for follow up    Chief Complaint:   Chief Complaint   Patient presents with    Anxiety    Depression    PTSD        Data: Pt reported that she is doing alright, had a stressful situation. 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 Pt reported that she is now a official member of the [employer] and enjoying. Pt reflected on issues and conflict that she recently had with her mother. Pt reported they have not been attending family therapy. Pt reported that she is starting to take over the bills that she is responsible for. Pt reported that overall she feels like her MH has been at a good level. Pt reflected on coping skills she has implemented that have been helpful for  her MH.      Clinical Maneuvering/Intervention: Assisted patient in processing above session content; acknowledged and normalized patient’s thoughts, feelings, and concerns.  Rationalized patient thought process regarding concerns presented at session.  Discussed triggers associated with patient's  anxiety , depression , and PTSD Also discussed coping skills for patient to implement such as grounding , mindfulness , self care , and positive self talk     Allowed patient to freely discuss issues without interruption or judgment. Provided safe, confidential environment to facilitate the development of positive therapeutic relationship and encourage open, honest communication. Assisted patient in identifying risk factors which would indicate the need for higher level of care including thoughts to harm self or others and/or self-harming behavior and encouraged patient to contact this office, call 911, or present to the nearest emergency room should any of these events occur. Discussed crisis intervention services and means to access. Patient adamantly and convincingly denies current suicidal or homicidal ideation or perceptual disturbance.    Assessment: Pt was alert and oriented x3.  Pt appears open and honest re MH symptoms that have affected life in a negative way. Pt appears motivated to improve MH symptoms and overall quality of life. Pt appears to have found a sense of community within the [employer]. Pt appears to be struggling with the stress from recent work experience. Pt appears to be avoiding conversations to possibly aid with the work stress. Pt appears to maintain relative stability compared to their baseline. Although, pt continues to struggle with depression symptoms causing impairment in important areas of functioning.      Patient appears to maintain relative stability as compared to their baseline.  However, patient continues to struggle with   Chief Complaint   Patient presents with    Anxiety     Depression    PTSD    which continues to cause impairment in important areas of functioning.  A result, they can be reasonably expected to continue to benefit from treatment and would likely be at increased risk for decompensation otherwise.      Mental Status Exam:   Hygiene:   good  Cooperation:  Cooperative  Eye Contact:  Fair  Psychomotor Behavior:  Appropriate  Affect:  Appropriate  Mood: normal  Speech:  Normal  Thought Process:  Linear  Thought Content:  Mood congruent  Suicidal:  None  Homicidal:  None  Hallucinations:  None  Delusion:  None  Memory:  Intact  Orientation:  Grossly intact  Reliability:  fair  Insight:  Fair  Judgement:  Fair  Impulse Control:  Fair  Physical/Medical Issues:  No        Patient's Support Network Includes:  significant other    Functional Status: No impairment    Progress toward goal: Not at goal    Prognosis: Good with Ongoing Treatment     Plan:     Patient will continue in individual outpatient therapy with focus on improved functioning and coping skills, maintaining stability, and avoiding decompensation and the need for higher level of care.    Patient will adhere to medication regimen as prescribed and report any side effects. Patient will contact this office, call 911 or present to the nearest emergency room should suicidal or homicidal ideations occur. Provide Cognitive Behavioral Therapy and Solution Focused Therapy to improve functioning, maintain stability, and avoid decompensation and the need for higher level of care.     Return in about 2 weeks (around 4/1/2025).      VISIT DIAGNOSIS:    Diagnosis Plan   1. Moderate episode of recurrent major depressive disorder        2. AMELIA (generalized anxiety disorder)        3. Chronic post-traumatic stress disorder (PTSD)         15:33 EDT         This document has been electronically signed by Hortencia Manjarrez LCSW  March 18, 2025      Part of this note may be an electronic transcription/translation of spoken language to  printed text using the Dragon Dictation System.

## 2025-04-07 RX ORDER — BUPROPION HYDROCHLORIDE 150 MG/1
150 TABLET ORAL EVERY MORNING
Qty: 30 TABLET | Refills: 2 | Status: SHIPPED | OUTPATIENT
Start: 2025-04-07 | End: 2025-07-06

## 2025-04-18 DIAGNOSIS — F41.1 GAD (GENERALIZED ANXIETY DISORDER): ICD-10-CM

## 2025-04-18 DIAGNOSIS — F33.1 MODERATE EPISODE OF RECURRENT MAJOR DEPRESSIVE DISORDER: ICD-10-CM

## 2025-04-21 RX ORDER — SERTRALINE HYDROCHLORIDE 100 MG/1
200 TABLET, FILM COATED ORAL DAILY
Qty: 60 TABLET | Refills: 2 | Status: SHIPPED | OUTPATIENT
Start: 2025-04-21

## 2025-04-23 RX ORDER — BUSPIRONE HYDROCHLORIDE 10 MG/1
10 TABLET ORAL EVERY 12 HOURS SCHEDULED
Qty: 60 TABLET | Refills: 2 | Status: SHIPPED | OUTPATIENT
Start: 2025-04-23

## 2025-04-29 ENCOUNTER — TELEMEDICINE (OUTPATIENT)
Dept: PSYCHIATRY | Facility: CLINIC | Age: 25
End: 2025-04-29
Payer: COMMERCIAL

## 2025-04-29 DIAGNOSIS — F33.1 MODERATE EPISODE OF RECURRENT MAJOR DEPRESSIVE DISORDER: ICD-10-CM

## 2025-04-29 DIAGNOSIS — F41.1 GAD (GENERALIZED ANXIETY DISORDER): Primary | ICD-10-CM

## 2025-04-29 DIAGNOSIS — F43.12 CHRONIC POST-TRAUMATIC STRESS DISORDER (PTSD): ICD-10-CM

## 2025-04-29 PROCEDURE — 90837 PSYTX W PT 60 MINUTES: CPT

## 2025-04-29 NOTE — PROGRESS NOTES
Date: 2025  Time In: 16:31 EDT  Time out: 5:24 PM EST    This provider is located at [facility], [facility] [place], [place], [facility], using a secure [employer] Video Visit through [employer]. Patient is being seen remotely via telehealth at their home address is located in [place]. Patient stated they are in a secure environment for this session. The patient's condition being diagnosed and treated is appropriate for telemedicine. The provider identified themself as well as their credentials. The patient, or  patient's legal guardian consent to be seen remotely, and when consent is given they understand that the consent allows for patient identifiable information to be sent to a third party as needed. They may refuse to be seen remotely at any time. The electronic data is encrypted and password protected, and the patient's or  legal guardian has been advised of the potential risks to privacy not withstanding such measures.   PT Identifiers used: Name and .    You have chosen to receive care through a telehealth visit.  Do you consent to use a video/audio connection for your medical care today? Yes    Subjective   Estela Santos is a 24 y.o. female who presents today for follow up    Chief Complaint:   Chief Complaint   Patient presents with    Anxiety    Depression    PTSD        Data: Pt reported that she is doing alright. 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 Pt reported that she has been trying otr stay in a positive mindset in the negative environment. Pt reported that her mom quit her job and thinks this has improved their relationship (less stressed). Pt reported that she struggled with a couple days of depression with thinking of her recently  cousin. Pt that their  birthday was last week, gentle reminder. Pt reported that she has been talking to her partner.      Clinical Maneuvering/Intervention:  Assisted patient in processing above session content; acknowledged and normalized patient’s thoughts, feelings, and concerns.  Rationalized patient thought process regarding concerns presented at session.  Discussed triggers associated with patient's  anxiety , depression , and PTSD Also discussed coping skills for patient to implement such as grounding , mindfulness , increasing activity , self care , and positive self talk     Allowed patient to freely discuss issues without interruption or judgment. Provided safe, confidential environment to facilitate the development of positive therapeutic relationship and encourage open, honest communication. Assisted patient in identifying risk factors which would indicate the need for higher level of care including thoughts to harm self or others and/or self-harming behavior and encouraged patient to contact this office, call 911, or present to the nearest emergency room should any of these events occur. Discussed crisis intervention services and means to access. Patient adamantly and convincingly denies current suicidal or homicidal ideation or perceptual disturbance.    Assessment: Pt was alert and oriented x3.  Pt appears open and honest re MH symptoms that have affected life in a negative way. Pt appears motivated to improve MH symptoms and overall quality of life. Pt appears to maintain relative stability compared to their baseline. Although, pt continues to struggle with depression symptoms causing impairment in important areas of functioning.Pt rated MH symptoms on a scale 1-10 (10 being the worst). Pt rated anxiety as 3/10, depression 5/10 and PTSD 3/10. Pt appears to have reminders of  cousin, attempting to comfort self with their activities. Pt appears to be getting along with her mom better with lack of external factors.  Pt appeared receptive to techniques and encouragements discussed in session.     Patient appears to maintain relative stability as compared to their baseline.  However, patient continues to struggle with   Chief Complaint   Patient presents with    Anxiety    Depression    PTSD    which continues to cause impairment in important areas of functioning.  A result, they can be reasonably expected to continue to benefit from treatment and would likely be at increased risk for decompensation otherwise.        Mental Status Exam:   Hygiene:   good  Cooperation:  Cooperative  Eye Contact:  Fair  Psychomotor Behavior:  Appropriate  Affect:  Appropriate  Mood: normal  Speech:  Normal  Thought Process:  Linear  Thought Content:  Normal  Suicidal:  None  Homicidal:  None  Hallucinations:  None  Delusion:  None  Memory:  Intact  Orientation:  Person, Place, and Time  Reliability:  fair  Insight:  Fair  Judgement:  Fair  Impulse Control:  Fair  Physical/Medical Issues:  No        Patient's Support Network Includes:  significant other    Functional Status: No impairment    Progress toward goal: Not at goal    Prognosis: Fair with Ongoing Treatment     Plan:     Patient will continue in individual outpatient therapy with focus on improved functioning and coping skills, maintaining stability, and avoiding decompensation and the need for higher level of care.    Patient will adhere to medication regimen as prescribed and report any side effects. Patient will contact this office, call 911 or present to the nearest emergency room should suicidal or homicidal ideations occur. Provide Cognitive Behavioral Therapy and Solution Focused Therapy to improve functioning, maintain stability, and avoid decompensation and the need for higher level of care.     Return in about 8 weeks (around 6/24/2025).      VISIT DIAGNOSIS:    Diagnosis Plan   1. AMELIA (generalized anxiety disorder)        2. Moderate episode of recurrent major depressive disorder         3. Chronic post-traumatic stress disorder (PTSD)         16:31 EDT         This document has been electronically signed by Hortencia Manjarrez LCSW  April 29, 2025      Part of this note may be an electronic transcription/translation of spoken language to printed text using the Dragon Dictation System.

## 2025-04-29 NOTE — TREATMENT PLAN
Multi-Disciplinary Problems (from Behavioral Health Treatment Plan)      Active Problems       Problem: Anxiety  Start Date: 04/29/25      Problem Details: The patient self-scales this problem as a 3 with 10 being the worst.          Goal Priority Start Date Expected End Date End Date    Patient will develop and implement behavioral and cognitive strategies to reduce anxiety and irrational fears. -- 04/29/25 10/28/25 --    Goal Details: Progress toward goal:  The patient self-scales their progress related to this goal as a 3 with 10 being the worst.        Goal Intervention Frequency Start Date End Date    Help patient explore past emotional issues in relation to present anxiety. Q Month 04/29/25 --    Intervention Details: Duration of treatment until discharged.        Goal Intervention Frequency Start Date End Date    Help patient develop an awareness of their cognitive and physical responses to anxiety. Q Month 04/29/25 --    Intervention Details: Duration of treatment until discharged.                Problem: Depression  Start Date: 04/29/25      Problem Details: The patient self-scales this problem as a 5 with 10 being the worst.          Goal Priority Start Date Expected End Date End Date    Patient will demonstrate the ability to initiate new constructive life skills outside of sessions on a consistent basis. -- 04/29/25 10/28/25 --    Goal Details: Progress toward goal:  The patient self-scales their progress related to this goal as a 5 with 10 being the worst.        Goal Intervention Frequency Start Date End Date    Assist patient in setting attainable activities of daily living goals. Q Month 04/29/25 --      Goal Intervention Frequency Start Date End Date    Provide education about depression Q Month 04/29/25 --    Intervention Details: Duration of treatment until discharged.        Goal Intervention Frequency Start Date End Date    Assist patient in developing healthy coping strategies. Q Month 04/29/25  --    Intervention Details: Duration of treatment until discharged.                Problem: Trauma and Stressor Related Disorders  Start Date: 04/29/25      Problem Details: The patient self-scales this problem as a 3 with 10 being the worst.          Goal Priority Start Date Expected End Date End Date    Patient will process and move through trauma in a way that improves self regard and the patients ability to function optimally in the world around them. -- 04/29/25 10/28/25 --    Goal Details: Progress toward goal:  The patient self-scales their progress related to this goal as a 3 with 10 being the worst.        Goal Intervention Frequency Start Date End Date    Assist patient in identifying ways that trauma has negatively impacted their view of themselves and the world. Q Month 04/29/25 --    Intervention Details: Duration of treatment until discharged.        Goal Intervention Frequency Start Date End Date    Process trauma in the context of the safe session environment. Q Month 04/29/25 --    Intervention Details: Duration of treatment until discharged.        Goal Intervention Frequency Start Date End Date    Develop a plan of behavior changes that will reduce the stress of the trauma. Q Month 04/29/25 --    Intervention Details: Duration of treatment until discharged.                        Reviewed By       Hortencia Manjarrez LCSW 04/29/25 [id number]                     I have discussed and reviewed this treatment plan with the patient.

## 2025-05-21 ENCOUNTER — TELEMEDICINE (OUTPATIENT)
Dept: PSYCHIATRY | Facility: CLINIC | Age: 25
End: 2025-05-21
Payer: COMMERCIAL

## 2025-05-21 DIAGNOSIS — F41.1 GAD (GENERALIZED ANXIETY DISORDER): ICD-10-CM

## 2025-05-21 DIAGNOSIS — F33.1 MODERATE EPISODE OF RECURRENT MAJOR DEPRESSIVE DISORDER: Primary | ICD-10-CM

## 2025-05-21 DIAGNOSIS — F43.12 CHRONIC POST-TRAUMATIC STRESS DISORDER (PTSD): ICD-10-CM
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
## 2025-05-23 ENCOUNTER — TELEPHONE (OUTPATIENT)
Dept: PSYCHIATRY | Facility: CLINIC | Age: 25
End: 2025-05-23
Payer: COMMERCIAL

## 2025-06-23 DIAGNOSIS — F33.1 MODERATE EPISODE OF RECURRENT MAJOR DEPRESSIVE DISORDER: ICD-10-CM

## 2025-06-23 DIAGNOSIS — F41.1 GAD (GENERALIZED ANXIETY DISORDER): ICD-10-CM

## 2025-06-23 RX ORDER — SERTRALINE HYDROCHLORIDE 100 MG/1
200 TABLET, FILM COATED ORAL DAILY
Qty: 60 TABLET | Refills: 2 | Status: SHIPPED | OUTPATIENT
Start: 2025-06-23

## 2025-07-30 RX ORDER — BUSPIRONE HYDROCHLORIDE 10 MG/1
10 TABLET ORAL EVERY 12 HOURS SCHEDULED
Qty: 60 TABLET | Refills: 2 | Status: SHIPPED | OUTPATIENT
Start: 2025-07-30